# Patient Record
Sex: FEMALE | Race: WHITE | Employment: FULL TIME | URBAN - METROPOLITAN AREA
[De-identification: names, ages, dates, MRNs, and addresses within clinical notes are randomized per-mention and may not be internally consistent; named-entity substitution may affect disease eponyms.]

---

## 2017-09-01 PROBLEM — M54.50 ACUTE BILATERAL LOW BACK PAIN WITHOUT SCIATICA: Status: ACTIVE | Noted: 2017-09-01

## 2018-04-04 PROCEDURE — 88175 CYTOPATH C/V AUTO FLUID REDO: CPT | Performed by: OBSTETRICS & GYNECOLOGY

## 2018-04-04 PROCEDURE — 87491 CHLMYD TRACH DNA AMP PROBE: CPT | Performed by: OBSTETRICS & GYNECOLOGY

## 2018-04-04 PROCEDURE — 36415 COLL VENOUS BLD VENIPUNCTURE: CPT | Performed by: OBSTETRICS & GYNECOLOGY

## 2018-04-04 PROCEDURE — 87591 N.GONORRHOEAE DNA AMP PROB: CPT | Performed by: OBSTETRICS & GYNECOLOGY

## 2018-04-04 PROCEDURE — 86780 TREPONEMA PALLIDUM: CPT | Performed by: OBSTETRICS & GYNECOLOGY

## 2018-04-04 PROCEDURE — 87389 HIV-1 AG W/HIV-1&-2 AB AG IA: CPT | Performed by: OBSTETRICS & GYNECOLOGY

## 2018-05-08 ENCOUNTER — NURSE ONLY (OUTPATIENT)
Dept: FAMILY MEDICINE CLINIC | Facility: CLINIC | Age: 38
End: 2018-05-08

## 2018-05-08 VITALS
SYSTOLIC BLOOD PRESSURE: 120 MMHG | TEMPERATURE: 98 F | BODY MASS INDEX: 39 KG/M2 | WEIGHT: 256 LBS | OXYGEN SATURATION: 98 % | DIASTOLIC BLOOD PRESSURE: 60 MMHG | RESPIRATION RATE: 18 BRPM | HEART RATE: 67 BPM

## 2018-05-08 DIAGNOSIS — J01.40 ACUTE NON-RECURRENT PANSINUSITIS: Primary | ICD-10-CM

## 2018-05-08 DIAGNOSIS — M25.471 SWELLING OF BOTH ANKLES: ICD-10-CM

## 2018-05-08 DIAGNOSIS — M25.472 SWELLING OF BOTH ANKLES: ICD-10-CM

## 2018-05-08 DIAGNOSIS — H69.83 DYSFUNCTION OF BOTH EUSTACHIAN TUBES: ICD-10-CM

## 2018-05-08 PROCEDURE — 99202 OFFICE O/P NEW SF 15 MIN: CPT | Performed by: NURSE PRACTITIONER

## 2018-05-08 RX ORDER — DOXYCYCLINE HYCLATE 100 MG/1
100 CAPSULE ORAL 2 TIMES DAILY
Qty: 14 CAPSULE | Refills: 0 | Status: SHIPPED | OUTPATIENT
Start: 2018-05-08 | End: 2018-05-15

## 2018-05-08 NOTE — PATIENT INSTRUCTIONS
-take daily allergy medication.  claritin or zyrtec are good over the counter options  -stay well hydrated and rest  -gargle with salt water   -ibuprofen or tylenol as needed  -follow up if you develop a fever, worsening in symptoms or no improvement in 3-4 · An expectorant containing guaifenesin may help thin the mucus and promote drainage from the sinuses. · Over-the-counter decongestants may be used unless a similar medicine was prescribed.  Nasal sprays work the fastest. Use one that contains phenylephrin © 1277-6142 [employer] [address]. 140[address] [facility], [facility]. All rights reserved. This information is not intended as a substitute for professional medical care. Always follow your healthcare professional's instructions.

## 2018-05-08 NOTE — PROGRESS NOTES
CHIEF COMPLAINT:   Patient presents with:  Ear Pain: pt c\o of bilateral ear pain, x 6days       HPI:   Algernon Dance is a 40year old female who presents for sinus congestion and ear pain for  6  days. Symptoms have been worsening since onset.  Si Albuterol Sulfate HFA (PROAIR HFA) 108 (90 Base) MCG/ACT Inhalation Aero Soln Inhale 2 puffs into the lungs every 6 (six) hours as needed. Disp: 1 Inhaler Rfl: 0   Rosuvastatin Calcium 20 MG Oral Tab Take 1 tablet (20 mg total) by mouth nightly.  To replace Francesca Stubbs MD;  Location: [place] [address] MANAGEMENT  5/28/2014: Thony Rojas NDL/CATH SPI DX/THER MZW N/A      Comment: Procedure: LUMBAR EPIDURAL;  Surgeon:                Tonia Walker MD;  Location: [facility] 10/30/2014: INJECTION, W/WO CONTRAST, DX/THERAPEUTIC SUBST* N/A      Comment: Procedure: LUMBAR EPIDURAL;  Surgeon:                Marylee Parry, MD;  Location: [address] [place] [address] Drive MANAGEMENT  12/3/2014: INJECTION, W/WO CONTRAST, DX/THERAP Alcohol use: Yes           0.0 oz/week     Comment: social        REVIEW OF SYSTEMS:   GENERAL: feeling tired, no unplanned weight change  SKIN: no rashes or abnormal skin lesions  HEENT: See HPI.     LUNGS: denies shortness of breath or wheezing, See HPI Discussed exam findings and of ear pain and sinus congestion. Will start on antibiotics. Given history of sinus symptoms, advised to start daily allergy medication. Answered patient's questions regarding if ankle swelling was related to illness.  Discussed The sinuses are air-filled spaces within the bones of the face. They connect to the inside of the nose. Sinusitis is an inflammation of the tissue lining the sinus cavity. Sinus inflammation can occur during a cold.  It can also be due to allergies to polle · Do not use nasal rinses or irrigation during an acute sinus infection, unless told to by your health care provider. Rinsing may spread the infection to other sinuses.   · Use acetaminophen or ibuprofen to control pain, unless another pain medicine was pre

## 2018-06-22 PROCEDURE — 83001 ASSAY OF GONADOTROPIN (FSH): CPT | Performed by: INTERNAL MEDICINE

## 2019-01-03 PROBLEM — G44.209 TENSION HEADACHE: Status: ACTIVE | Noted: 2019-01-03

## 2019-01-03 PROBLEM — R05.9 COUGH: Status: ACTIVE | Noted: 2019-01-03

## 2019-01-03 PROBLEM — R09.81 NASAL CONGESTION: Status: ACTIVE | Noted: 2019-01-03

## 2019-01-03 PROBLEM — J01.00 ACUTE NON-RECURRENT MAXILLARY SINUSITIS: Status: ACTIVE | Noted: 2019-01-03

## 2019-01-19 PROBLEM — M54.50 ACUTE BILATERAL LOW BACK PAIN WITHOUT SCIATICA: Status: RESOLVED | Noted: 2017-09-01 | Resolved: 2019-01-19

## 2019-01-19 PROBLEM — R05.9 COUGH: Status: RESOLVED | Noted: 2019-01-03 | Resolved: 2019-01-19

## 2019-01-19 PROBLEM — J01.00 ACUTE NON-RECURRENT MAXILLARY SINUSITIS: Status: RESOLVED | Noted: 2019-01-03 | Resolved: 2019-01-19

## 2019-01-19 PROBLEM — G44.209 TENSION HEADACHE: Status: RESOLVED | Noted: 2019-01-03 | Resolved: 2019-01-19

## 2019-01-19 PROBLEM — R09.81 NASAL CONGESTION: Status: RESOLVED | Noted: 2019-01-03 | Resolved: 2019-01-19

## 2019-04-01 PROBLEM — R73.03 PREDIABETES: Status: ACTIVE | Noted: 2019-04-01

## 2019-04-01 PROBLEM — E78.5 DYSLIPIDEMIA: Status: ACTIVE | Noted: 2019-04-01

## 2019-04-01 PROCEDURE — 36415 COLL VENOUS BLD VENIPUNCTURE: CPT | Performed by: INTERNAL MEDICINE

## 2019-04-01 PROCEDURE — 82671 ASSAY OF ESTROGENS: CPT | Performed by: INTERNAL MEDICINE

## 2019-06-04 PROBLEM — J01.01 ACUTE RECURRENT MAXILLARY SINUSITIS: Status: ACTIVE | Noted: 2019-06-04

## 2019-07-29 PROCEDURE — 87624 HPV HI-RISK TYP POOLED RSLT: CPT | Performed by: OBSTETRICS & GYNECOLOGY

## 2019-07-29 PROCEDURE — 88175 CYTOPATH C/V AUTO FLUID REDO: CPT | Performed by: OBSTETRICS & GYNECOLOGY

## 2019-08-02 PROBLEM — E88.81 METABOLIC SYNDROME: Status: ACTIVE | Noted: 2019-08-02

## 2019-08-02 PROBLEM — E78.1 HYPERTRIGLYCERIDEMIA: Status: ACTIVE | Noted: 2019-08-02

## 2019-08-02 PROBLEM — E88.810 METABOLIC SYNDROME: Status: ACTIVE | Noted: 2019-08-02

## 2019-08-10 PROCEDURE — 83525 ASSAY OF INSULIN: CPT | Performed by: INTERNAL MEDICINE

## 2019-08-10 PROCEDURE — 82397 CHEMILUMINESCENT ASSAY: CPT | Performed by: INTERNAL MEDICINE

## 2019-08-12 PROBLEM — E88.819 INSULIN RESISTANCE: Status: ACTIVE | Noted: 2019-08-12

## 2019-08-12 PROBLEM — E88.81 INSULIN RESISTANCE: Status: ACTIVE | Noted: 2019-08-12

## 2019-08-12 PROCEDURE — 88305 TISSUE EXAM BY PATHOLOGIST: CPT | Performed by: INTERNAL MEDICINE

## 2019-08-30 ENCOUNTER — OFFICE VISIT (OUTPATIENT)
Dept: FAMILY MEDICINE CLINIC | Facility: CLINIC | Age: 39
End: 2019-08-30
Payer: COMMERCIAL

## 2019-08-30 VITALS
OXYGEN SATURATION: 99 % | RESPIRATION RATE: 18 BRPM | BODY MASS INDEX: 37.92 KG/M2 | HEART RATE: 72 BPM | SYSTOLIC BLOOD PRESSURE: 122 MMHG | WEIGHT: 256 LBS | HEIGHT: 69 IN | DIASTOLIC BLOOD PRESSURE: 80 MMHG | TEMPERATURE: 98 F

## 2019-08-30 DIAGNOSIS — J01.00 ACUTE MAXILLARY SINUSITIS, RECURRENCE NOT SPECIFIED: Primary | ICD-10-CM

## 2019-08-30 PROCEDURE — 99213 OFFICE O/P EST LOW 20 MIN: CPT | Performed by: NURSE PRACTITIONER

## 2019-08-30 RX ORDER — FLUTICASONE PROPIONATE 50 MCG
SPRAY, SUSPENSION (ML) NASAL
Qty: 1 BOTTLE | Refills: 0 | Status: SHIPPED | OUTPATIENT
Start: 2019-08-30 | End: 2019-10-04

## 2019-08-30 RX ORDER — DOXYCYCLINE HYCLATE 100 MG/1
100 CAPSULE ORAL 2 TIMES DAILY
Qty: 14 CAPSULE | Refills: 0 | Status: SHIPPED | OUTPATIENT
Start: 2019-08-30 | End: 2019-09-06

## 2019-08-30 NOTE — PROGRESS NOTES
CHIEF COMPLAINT:   Patient presents with:  Rhinorrhea: with facial pain/pressure, slight cough - x6 days      HPI:   Algernon Dance is a 44year old female who presents for sinus congestion for 6 days. Symptoms have suddenly worsened.  Sinus congestio Procedure Laterality Date   • BACK SURGERY  1/8/15    L5-S1 lami disk    •   2010   • COLONOSCOPY, POSSIBLE BIOPSY, POSSIBLE POLYPECTOMY 27787 N/A 2019    Performed by Oscar Medina MD at [address] N/A 12/3/2014 HEAD: atraumatic, normocephalic,  +++ tenderness on palpation of maxillary sinuses  EYES: conjunctiva clear  EARS: TM's clear gray, no bulging, no retraction, no fluid, bony landmarks visualized  NOSE: nostrils patent, green/yellow nasal mucous, nasal muco The sinuses are air-filled spaces within the bones of the face. They connect to the inside of the nose. Sinusitis is an inflammation of the tissue that lines the sinuses. Sinusitis can occur during a cold.  It can also happen due to allergies to pollens and · Do not use nasal rinses or irrigation during an acute sinus infection, unless your healthcare provider tells you to. Rinsing may spread the infection to other areas in your sinuses.   · Use acetaminophen or ibuprofen to control pain, unless another pain m

## 2019-10-31 PROBLEM — J22 LOWER RESPIRATORY INFECTION: Status: ACTIVE | Noted: 2019-10-31

## 2019-10-31 PROBLEM — J01.01 ACUTE RECURRENT MAXILLARY SINUSITIS: Status: RESOLVED | Noted: 2019-06-04 | Resolved: 2019-10-31

## 2019-11-12 PROBLEM — J01.01 ACUTE RECURRENT MAXILLARY SINUSITIS: Status: ACTIVE | Noted: 2019-11-12

## 2019-11-12 PROBLEM — J22 LOWER RESPIRATORY INFECTION: Status: RESOLVED | Noted: 2019-10-31 | Resolved: 2019-11-12

## 2019-12-16 PROBLEM — J02.8 ACUTE PHARYNGITIS DUE TO OTHER SPECIFIED ORGANISMS: Status: ACTIVE | Noted: 2019-12-16

## 2019-12-16 PROBLEM — J03.80 ACUTE TONSILLITIS DUE TO OTHER SPECIFIED ORGANISMS: Status: ACTIVE | Noted: 2019-12-16

## 2019-12-16 PROBLEM — J02.9 SORE THROAT: Status: ACTIVE | Noted: 2019-12-16

## 2019-12-16 PROBLEM — J01.00 ACUTE NON-RECURRENT MAXILLARY SINUSITIS: Status: ACTIVE | Noted: 2019-12-16

## 2020-02-20 ENCOUNTER — OFFICE VISIT (OUTPATIENT)
Dept: FAMILY MEDICINE CLINIC | Facility: CLINIC | Age: 40
End: 2020-02-20
Payer: COMMERCIAL

## 2020-02-20 VITALS
TEMPERATURE: 98 F | RESPIRATION RATE: 20 BRPM | SYSTOLIC BLOOD PRESSURE: 126 MMHG | HEART RATE: 88 BPM | OXYGEN SATURATION: 98 % | BODY MASS INDEX: 36.98 KG/M2 | HEIGHT: 68 IN | WEIGHT: 244 LBS | DIASTOLIC BLOOD PRESSURE: 68 MMHG

## 2020-02-20 DIAGNOSIS — J02.9 SORE THROAT: ICD-10-CM

## 2020-02-20 DIAGNOSIS — J32.9 VIRAL SINUSITIS: Primary | ICD-10-CM

## 2020-02-20 DIAGNOSIS — B97.89 VIRAL SINUSITIS: Primary | ICD-10-CM

## 2020-02-20 LAB
CONTROL LINE PRESENT WITH A CLEAR BACKGROUND (YES/NO): YES YES/NO
KIT LOT #: NORMAL NUMERIC
STREP GRP A CUL-SCR: NEGATIVE

## 2020-02-20 PROCEDURE — 99213 OFFICE O/P EST LOW 20 MIN: CPT | Performed by: NURSE PRACTITIONER

## 2020-02-20 PROCEDURE — 87880 STREP A ASSAY W/OPTIC: CPT | Performed by: NURSE PRACTITIONER

## 2020-02-20 PROCEDURE — 87081 CULTURE SCREEN ONLY: CPT | Performed by: NURSE PRACTITIONER

## 2020-02-20 RX ORDER — MULTIVITAMIN
TABLET ORAL
COMMUNITY
End: 2020-08-27 | Stop reason: ALTCHOICE

## 2020-02-20 NOTE — PROGRESS NOTES
CHIEF COMPLAINT:   Patient presents with:  Nasal Congestion: Sinus pressure, glands feels swollen, no energy, X 5 days      HPI:   Merlynn Apley is a 44year old female who presents for upper respiratory symptoms for  5 days.  Patient reports sore th Rose Marie Oliveira MD at [address] [place] [address] N/A 4/9/2012    Performed by Nils Ferrera MD at [address] [place] [address] N/A 3/21/2012    Performed by Nils Ferrera MD at [address] [place] [address] Time: 02/20/20 12:21 PM   Result Value Ref Range    Strep Grp A Screen Negative Negative    Control Line Present with a clear background (yes/no) Yes Yes/No    Kit Lot # [id number] Numeric    Kit Expiration Date 10/10/21 Date       ASSESSMENT AND PLAN:   Rohini Dickens

## 2020-05-29 PROBLEM — O99.210 OBESITY AFFECTING PREGNANCY, ANTEPARTUM: Status: ACTIVE | Noted: 2020-05-29

## 2020-05-29 PROBLEM — O09.529 AMA (ADVANCED MATERNAL AGE) MULTIGRAVIDA 35+ (HCC): Status: ACTIVE | Noted: 2020-05-29

## 2020-05-29 PROBLEM — O09.529 AMA (ADVANCED MATERNAL AGE) MULTIGRAVIDA 35+: Status: ACTIVE | Noted: 2020-05-29

## 2020-05-29 PROBLEM — O34.219 PREVIOUS CESAREAN DELIVERY AFFECTING PREGNANCY: Status: ACTIVE | Noted: 2020-05-29

## 2020-05-29 PROBLEM — O34.219 PREVIOUS CESAREAN DELIVERY AFFECTING PREGNANCY (HCC): Status: ACTIVE | Noted: 2020-05-29

## 2020-05-29 PROBLEM — O99.210 OBESITY AFFECTING PREGNANCY, ANTEPARTUM (HCC): Status: ACTIVE | Noted: 2020-05-29

## 2020-07-01 PROBLEM — J01.01 ACUTE RECURRENT MAXILLARY SINUSITIS: Status: RESOLVED | Noted: 2019-11-12 | Resolved: 2020-07-01

## 2020-07-01 PROBLEM — R05.9 COUGH: Status: RESOLVED | Noted: 2019-01-03 | Resolved: 2020-07-01

## 2020-07-01 PROBLEM — J03.80 ACUTE TONSILLITIS DUE TO OTHER SPECIFIED ORGANISMS: Status: RESOLVED | Noted: 2019-12-16 | Resolved: 2020-07-01

## 2020-07-01 PROBLEM — J01.00 ACUTE NON-RECURRENT MAXILLARY SINUSITIS: Status: RESOLVED | Noted: 2019-12-16 | Resolved: 2020-07-01

## 2020-07-01 PROBLEM — J02.9 SORE THROAT: Status: RESOLVED | Noted: 2019-12-16 | Resolved: 2020-07-01

## 2020-07-01 PROBLEM — J02.8 ACUTE PHARYNGITIS DUE TO OTHER SPECIFIED ORGANISMS: Status: RESOLVED | Noted: 2019-12-16 | Resolved: 2020-07-01

## 2020-07-24 ENCOUNTER — OFFICE VISIT (OUTPATIENT)
Dept: PERINATAL CARE | Facility: HOSPITAL | Age: 40
End: 2020-07-24
Attending: OBSTETRICS & GYNECOLOGY
Payer: COMMERCIAL

## 2020-07-24 VITALS
HEART RATE: 74 BPM | DIASTOLIC BLOOD PRESSURE: 84 MMHG | BODY MASS INDEX: 37.74 KG/M2 | WEIGHT: 249 LBS | SYSTOLIC BLOOD PRESSURE: 123 MMHG | HEIGHT: 68 IN

## 2020-07-24 DIAGNOSIS — O99.212 OBESITY AFFECTING PREGNANCY IN SECOND TRIMESTER: ICD-10-CM

## 2020-07-24 DIAGNOSIS — Z03.75 SUSPECTED SHORTENING OF CERVIX NOT FOUND: ICD-10-CM

## 2020-07-24 DIAGNOSIS — O09.529 ANTEPARTUM MULTIGRAVIDA OF ADVANCED MATERNAL AGE: Primary | ICD-10-CM

## 2020-07-24 PROCEDURE — 99243 OFF/OP CNSLTJ NEW/EST LOW 30: CPT | Performed by: OBSTETRICS & GYNECOLOGY

## 2020-07-24 PROCEDURE — 76811 OB US DETAILED SNGL FETUS: CPT | Performed by: OBSTETRICS & GYNECOLOGY

## 2020-07-24 PROCEDURE — 76817 TRANSVAGINAL US OBSTETRIC: CPT | Performed by: OBSTETRICS & GYNECOLOGY

## 2020-07-24 NOTE — PROGRESS NOTES
Outpatient Maternal-Fetal Medicine Consultation    Dear Dr. Dinora Charlton    Thank you for requesting ultrasound evaluation and maternal fetal medicine consultation on your patient Giana Yu.   As you are aware she is a 44year old female  with a substance, epidural/subarachnoid; lumbar/sacral (4/9/2012); fluor gid & loclzj ndl/cath spi dx/ther njx (4/9/2012); m-sedaj by  phys perfrmg svc 5+ yr (4/9/2012); injection, w/wo contrast, dx/therapeutic substance, epidural/subarachnoid; lumbar/sacral (N DAILY) Oral Tab, Take by mouth., Disp: , Rfl:   Allergies:   Pcns [Penicillins]      HIVES  Adhesive Tape (Marija*    RASH  Rosin [Pinus Strobu*    RASH    PHYSICAL EXAMINATION:  /84   Pulse 74   Ht 5' 8\" (1.727 m)   Wt 249 lb (112.9 kg)   LMP 03/02/2 ventricles, [place] of [place]casio, choroid plexus, Cisterna Magna, midline falx, cerebellum, cerebellar lobes, posterior fossa, vermis, cavum septi pellucidi.   Spine: appears normal but suboptimal  Heart: Visualized and normal appearance: cardiac location, left ultrasound examination cannot reliably exclude potential genetic abnormalities.  Given that the patient will be 36years old at the time of delivery I reviewed that her risk (at delivery) of having a  with any chromosome abnormality is 1:66 and with obese women, the ovulatory dysfunction is related to polycystic ovary syndrome (PCOS). It is also important to note that even among ovulatory women, increasing obesity is associated with decreasing spontaneous pregnancy rates.   The increased risk of miscar perioperative concerns, including emergency delivery, prolonged incision to delivery interval, blood loss >1000 mL, longer operative times, wound infection, thromboembolism, and endometritis.             Maternal obesity appears to be associated with a smal

## 2020-07-31 PROBLEM — O24.419 GESTATIONAL DIABETES: Status: ACTIVE | Noted: 2020-07-31

## 2020-07-31 PROBLEM — O24.419 GESTATIONAL DIABETES (HCC): Status: ACTIVE | Noted: 2020-07-31

## 2020-08-20 NOTE — PROGRESS NOTES
Outpatient Maternal-Fetal Medicine Consultation    Dear Dr. John Caruso    Thank you for requesting ultrasound evaluation and maternal fetal medicine consultation on your patient Chris Hoover.   As you are aware she is a 36year old female  with a adverse obstetric outcomes. We also reviewed her  increased risk of having diabetes later in life. The importance of good glycemic control and avoidance of prolonged hypoglycemia and hyperglycemia was addressed.     She was instructed on the diabetic diet; your patient. Please do not hesitate to contact me if additional questions or concerns arise. Edison Fuller. William Odell M.D. The majority of the time (>50%) was spent in review of records, consultation and coordination of care.   Our discussion is summarize

## 2020-08-21 ENCOUNTER — OFFICE VISIT (OUTPATIENT)
Dept: PERINATAL CARE | Facility: HOSPITAL | Age: 40
End: 2020-08-21
Attending: OBSTETRICS & GYNECOLOGY
Payer: COMMERCIAL

## 2020-08-21 VITALS
BODY MASS INDEX: 38.19 KG/M2 | HEIGHT: 68 IN | DIASTOLIC BLOOD PRESSURE: 74 MMHG | SYSTOLIC BLOOD PRESSURE: 110 MMHG | HEART RATE: 94 BPM | WEIGHT: 252 LBS

## 2020-08-21 DIAGNOSIS — E66.01 MORBID OBESITY (HCC): ICD-10-CM

## 2020-08-21 DIAGNOSIS — O24.414 INSULIN CONTROLLED GESTATIONAL DIABETES MELLITUS (GDM) IN SECOND TRIMESTER: Primary | ICD-10-CM

## 2020-08-21 DIAGNOSIS — O09.529 ANTEPARTUM MULTIGRAVIDA OF ADVANCED MATERNAL AGE: ICD-10-CM

## 2020-08-21 DIAGNOSIS — O99.212 OBESITY AFFECTING PREGNANCY IN SECOND TRIMESTER: ICD-10-CM

## 2020-08-21 DIAGNOSIS — O24.419 GDM, CLASS A2: ICD-10-CM

## 2020-08-21 DIAGNOSIS — O09.522 MULTIGRAVIDA OF ADVANCED MATERNAL AGE IN SECOND TRIMESTER: ICD-10-CM

## 2020-08-21 DIAGNOSIS — O09.529 ANTEPARTUM MULTIGRAVIDA OF ADVANCED MATERNAL AGE: Primary | ICD-10-CM

## 2020-08-21 PROCEDURE — 76825 ECHO EXAM OF FETAL HEART: CPT | Performed by: OBSTETRICS & GYNECOLOGY

## 2020-08-21 PROCEDURE — 93325 DOPPLER ECHO COLOR FLOW MAPG: CPT | Performed by: OBSTETRICS & GYNECOLOGY

## 2020-08-21 PROCEDURE — 76827 ECHO EXAM OF FETAL HEART: CPT | Performed by: OBSTETRICS & GYNECOLOGY

## 2020-08-21 PROCEDURE — 93325 DOPPLER ECHO COLOR FLOW MAPG: CPT

## 2020-08-21 PROCEDURE — 76827 ECHO EXAM OF FETAL HEART: CPT

## 2020-08-21 PROCEDURE — 99243 OFF/OP CNSLTJ NEW/EST LOW 30: CPT | Performed by: OBSTETRICS & GYNECOLOGY

## 2020-08-24 DIAGNOSIS — O24.419 GDM, CLASS A2: ICD-10-CM

## 2020-08-27 DIAGNOSIS — O24.419 GDM, CLASS A2: ICD-10-CM

## 2020-09-01 NOTE — PROGRESS NOTES
Outpatient Maternal-Fetal Medicine Consultation     Dear Dr. Regla Briones     Thank you for requesting ultrasound evaluation and maternal fetal medicine consultation on your patient Dat Rincon.   As you are aware she is a 36year old female  with anterior. Cardiac activity is present at 129 bpm  EFW 1129 g ( 2 lb 8 oz); 61%. LAUREN is  17.6 cm.   MVP is 6.8 cm    See Imaging Tab For Complete Ultrasound Report       DISCUSSION  During her visit we discussed and reviewed the following issues:  LURDES weight gain was reviewed.     IMPRESSION:  · IUP at 27w1d  · AMA: low-risk cell free fetal DNA, declined invasive testing  · Obesity  · GDM A2 - probable pregestational; poorly controlled  · Suboptimal fetal anatomy views on repeated attempts     RECOMMEND

## 2020-09-08 ENCOUNTER — ULTRASOUND ENCOUNTER (OUTPATIENT)
Dept: PERINATAL CARE | Facility: HOSPITAL | Age: 40
End: 2020-09-08
Attending: OBSTETRICS & GYNECOLOGY
Payer: COMMERCIAL

## 2020-09-08 VITALS
WEIGHT: 254 LBS | RESPIRATION RATE: 20 BRPM | SYSTOLIC BLOOD PRESSURE: 123 MMHG | DIASTOLIC BLOOD PRESSURE: 75 MMHG | HEART RATE: 74 BPM | BODY MASS INDEX: 38.49 KG/M2 | HEIGHT: 68 IN

## 2020-09-08 DIAGNOSIS — O24.419 GDM, CLASS A2: ICD-10-CM

## 2020-09-08 DIAGNOSIS — O99.212 OBESITY AFFECTING PREGNANCY IN SECOND TRIMESTER: ICD-10-CM

## 2020-09-08 DIAGNOSIS — O09.529 ANTEPARTUM MULTIGRAVIDA OF ADVANCED MATERNAL AGE: ICD-10-CM

## 2020-09-08 DIAGNOSIS — O09.529 ANTEPARTUM MULTIGRAVIDA OF ADVANCED MATERNAL AGE: Primary | ICD-10-CM

## 2020-09-08 DIAGNOSIS — O09.523 MULTIGRAVIDA OF ADVANCED MATERNAL AGE IN THIRD TRIMESTER: ICD-10-CM

## 2020-09-08 DIAGNOSIS — E11.9 TYPE 2 DIABETES MELLITUS WITHOUT COMPLICATION, WITHOUT LONG-TERM CURRENT USE OF INSULIN (HCC): ICD-10-CM

## 2020-09-08 PROCEDURE — 99215 OFFICE O/P EST HI 40 MIN: CPT | Performed by: OBSTETRICS & GYNECOLOGY

## 2020-09-08 PROCEDURE — 76816 OB US FOLLOW-UP PER FETUS: CPT | Performed by: OBSTETRICS & GYNECOLOGY

## 2020-09-08 RX ORDER — INSULIN LISPRO 100 [IU]/ML
INJECTION, SOLUTION INTRAVENOUS; SUBCUTANEOUS
Qty: 1 DEVICE | Refills: 3 | Status: SHIPPED | OUTPATIENT
Start: 2020-09-08 | End: 2020-11-09

## 2020-09-08 NOTE — PROGRESS NOTES
Pt seen in M at 27 1/7 weeks gestation, ambulatory and without concerns. + FM. Blood sugars available for review.

## 2020-09-14 DIAGNOSIS — O24.419 GDM, CLASS A2: ICD-10-CM

## 2020-09-21 DIAGNOSIS — O24.419 GDM, CLASS A2: ICD-10-CM

## 2020-09-22 ENCOUNTER — HOSPITAL ENCOUNTER (OUTPATIENT)
Dept: PERINATAL CARE | Facility: HOSPITAL | Age: 40
Discharge: HOME | End: 2020-09-22
Attending: PEDIATRICS
Payer: COMMERCIAL

## 2020-09-22 DIAGNOSIS — Z36.83 ENCOUNTER FOR FETAL SCREENING FOR CONGENITAL CARDIAC ABNORMALITIES: ICD-10-CM

## 2020-09-22 DIAGNOSIS — O09.523 MULTIGRAVIDA OF ADVANCED MATERNAL AGE IN THIRD TRIMESTER: ICD-10-CM

## 2020-09-22 DIAGNOSIS — E66.01 MORBID OBESITY (HCC): ICD-10-CM

## 2020-09-22 DIAGNOSIS — E88.81 INSULIN RESISTANCE: ICD-10-CM

## 2020-09-22 DIAGNOSIS — E88.81 INSULIN RESISTANCE: Primary | ICD-10-CM

## 2020-09-22 DIAGNOSIS — O99.212 OBESITY AFFECTING PREGNANCY IN SECOND TRIMESTER: ICD-10-CM

## 2020-09-22 PROCEDURE — 76827 ECHO EXAM OF FETAL HEART: CPT

## 2020-09-22 PROCEDURE — 76825 ECHO EXAM OF FETAL HEART: CPT | Performed by: PEDIATRICS

## 2020-09-22 PROCEDURE — 93325 DOPPLER ECHO COLOR FLOW MAPG: CPT

## 2020-09-22 NOTE — PROGRESS NOTES
CARDIOLOGY CONSULTATION :    Dear Dr. Dinora Springer     Thank you for requesting  fetal echocardiogram and  cardiology consultation on your patient Yunior Mtz.   As you are aware she is a 36year old female  with a solis preg the pulmonary artery by its branching) which crosses the course of the proximal aorta, indicative of normal relationship of the great arteries.  Main PA: the main pulmonary artery can be seen bifurcating into the ductus arteriosus and the right pulmonary ar testing  Obesity  GDM A2 - probable pregestational; previously poorly controlled, currently well controlled per patient  Suboptimal fetal anatomy views on repeated attempts  Limited unremarkable fetal echocardiogram    RECOMMENDATIONS :  Diabetes control

## 2020-09-28 ENCOUNTER — PATIENT MESSAGE (OUTPATIENT)
Dept: PERINATAL CARE | Facility: HOSPITAL | Age: 40
End: 2020-09-28

## 2020-09-28 DIAGNOSIS — O24.419 GDM, CLASS A2: ICD-10-CM

## 2020-09-28 RX ORDER — LANCETS 33 GAUGE
1 EACH MISCELLANEOUS 4 TIMES DAILY
Qty: 1 BOX | Refills: 2 | Status: SHIPPED | OUTPATIENT
Start: 2020-09-28 | End: 2020-11-30 | Stop reason: CLARIF

## 2020-09-28 NOTE — TELEPHONE ENCOUNTER
30 0/7 levemir 8 am., 22 HS    Fasting 5/5 106--123  Breakfast 1/2  100-124  Lunch 3/5        110-137  Dinner 1/3       099-040

## 2020-09-28 NOTE — TELEPHONE ENCOUNTER
Regarding: RE: Blood sugar review  Contact: [phone]  I do need lancets for my new machine. Can you please send a prescription for lancets for the OneTouch Verio machine? Please call into:   [facility] [address] [place] [address] [phone] -----       From:Dorys CORNEJO       Sent:9/28/2020  7:51 AM CDT         To:Bessie Correa    Subject:Blood sugar review    Good morning! Could you please update your blood sugars for Dr. Tammy Grimm to review this morning?   Thank you  Yonatan Razo RN MFM

## 2020-09-29 ENCOUNTER — TELEPHONE (OUTPATIENT)
Dept: PERINATAL CARE | Facility: HOSPITAL | Age: 40
End: 2020-09-29

## 2020-10-05 ENCOUNTER — PATIENT MESSAGE (OUTPATIENT)
Dept: PERINATAL CARE | Facility: HOSPITAL | Age: 40
End: 2020-10-05

## 2020-10-05 ENCOUNTER — TELEPHONE (OUTPATIENT)
Dept: PERINATAL CARE | Facility: HOSPITAL | Age: 40
End: 2020-10-05

## 2020-10-05 DIAGNOSIS — O24.419 GDM, CLASS A2: ICD-10-CM

## 2020-10-05 NOTE — TELEPHONE ENCOUNTER
Crystal Mas! The wording is tricky on this message. .. this is for your upcoming appointment, not for a test scheduled today. Hope this clarifies.     Yonatan Razo RN MFM

## 2020-10-05 NOTE — TELEPHONE ENCOUNTER
GA  31 weeks    Levemir AM 10 units  HS 24 units  Lispro   AM 4 units  Dinner 4 units    4 out of 4   Range 103 to 122  Breakfast 0 out of 3  Range  96 to 105  Lunch 1 out of 4   Range 103 to 126   Dinner 1 out of 4  Range 103 to 124

## 2020-10-05 NOTE — TELEPHONE ENCOUNTER
GA  31 weeks     Levemir AM 10 units  HS 28 units  Lispro   AM 4 units  Dinner 4 units     4 out of 4   Range 103 to 122  Breakfast 0 out of 3  Range  96 to 105  Lunch 1 out of 4   Range 103 to 126   Dinner 1 out of 4  Range 103 to 124

## 2020-10-05 NOTE — TELEPHONE ENCOUNTER
From: Maritza Beltrán  To: Yunior Mtz  Sent: 10/5/2020 7:14 AM CDT  Subject: E CHECK IN VIA MY CHART    Thank you for scheduling your test with Afshan Yin.    Please complete your Echeck-In prior to your upcoming appointment by clicking epic

## 2020-10-06 DIAGNOSIS — Z34.90 PREGNANCY: Primary | ICD-10-CM

## 2020-10-07 NOTE — PROGRESS NOTES
Kenny Wayne    Dear Dr. Brenda Dodge    Thank you for requesting ultrasound evaluation and maternal fetal medicine consultation on your patient Andre Hennessy.   As you are aware she is a 36year old female  with a sin Judgment: Judgment normal.   Vitals signs and nursing note reviewed. OBSTETRIC ULTRASOUND  Single IUP in cephalic presentation. Placenta is anterior. Cardiac activity is present at 141 bpm  EFW 2194 g ( 4 lb 13 oz); 64%.     LAUREN is  1 on repeated attempts s/p fetal echo with Dr. Tc Crump     RECOMMENDATIONS:  · Continue care with Dr. Morales   ·  insulin : See above  · Continue four times daily capillary blood glucose assessments (fasting and 2 hour postprandial)  · Weekly Maternal-Fetal

## 2020-10-12 ENCOUNTER — TELEPHONE (OUTPATIENT)
Dept: PERINATAL CARE | Facility: HOSPITAL | Age: 40
End: 2020-10-12

## 2020-10-12 DIAGNOSIS — O24.419 GDM, CLASS A2: ICD-10-CM

## 2020-10-12 NOTE — TELEPHONE ENCOUNTER
GA 32    Levemir AM 10 units   HS 28 units  Novolog   AM 4 units   PM 4 units      Fasting 4 out of 5         Range 91 to 101  Breakfast  0 out 5         Range 90 to 120  Lunch   0 out of 5         Range  108 to 120  Dinner 1 out of 5           Range 99

## 2020-10-12 NOTE — TELEPHONE ENCOUNTER
Levemir AM 10 units   HS 30 units  Novolog   AM 4 units   PM 4 units        Fasting 4 out of 5         Range 91 to 101  Breakfast  0 out 5         Range 90 to 120  Lunch   0 out of 5         Range  108 to 120  Dinner 1 out of 5           Range 99 to 122

## 2020-10-14 ENCOUNTER — OFFICE VISIT (OUTPATIENT)
Dept: PERINATAL CARE | Facility: HOSPITAL | Age: 40
End: 2020-10-14
Attending: OBSTETRICS & GYNECOLOGY
Payer: COMMERCIAL

## 2020-10-14 VITALS
WEIGHT: 258 LBS | BODY MASS INDEX: 39 KG/M2 | SYSTOLIC BLOOD PRESSURE: 119 MMHG | DIASTOLIC BLOOD PRESSURE: 84 MMHG | HEART RATE: 112 BPM

## 2020-10-14 DIAGNOSIS — E66.01 MORBID OBESITY (HCC): ICD-10-CM

## 2020-10-14 DIAGNOSIS — O24.419 GDM, CLASS A2: ICD-10-CM

## 2020-10-14 DIAGNOSIS — E11.9 TYPE 2 DIABETES MELLITUS WITHOUT COMPLICATION, WITHOUT LONG-TERM CURRENT USE OF INSULIN (HCC): ICD-10-CM

## 2020-10-14 DIAGNOSIS — O09.523 MULTIGRAVIDA OF ADVANCED MATERNAL AGE IN THIRD TRIMESTER: ICD-10-CM

## 2020-10-14 DIAGNOSIS — E88.81 INSULIN RESISTANCE: ICD-10-CM

## 2020-10-14 DIAGNOSIS — O09.522 MULTIGRAVIDA OF ADVANCED MATERNAL AGE IN SECOND TRIMESTER: Primary | ICD-10-CM

## 2020-10-14 DIAGNOSIS — O09.522 MULTIGRAVIDA OF ADVANCED MATERNAL AGE IN SECOND TRIMESTER: ICD-10-CM

## 2020-10-14 PROCEDURE — 99213 OFFICE O/P EST LOW 20 MIN: CPT | Performed by: OBSTETRICS & GYNECOLOGY

## 2020-10-14 PROCEDURE — 76816 OB US FOLLOW-UP PER FETUS: CPT | Performed by: OBSTETRICS & GYNECOLOGY

## 2020-10-14 PROCEDURE — 76819 FETAL BIOPHYS PROFIL W/O NST: CPT

## 2020-10-14 PROCEDURE — 76819 FETAL BIOPHYS PROFIL W/O NST: CPT | Performed by: OBSTETRICS & GYNECOLOGY

## 2020-10-19 ENCOUNTER — TELEPHONE (OUTPATIENT)
Dept: PERINATAL CARE | Facility: HOSPITAL | Age: 40
End: 2020-10-19

## 2020-10-19 RX ORDER — INSULIN LISPRO 100 [IU]/ML
INJECTION, SOLUTION INTRAVENOUS; SUBCUTANEOUS
Qty: 1 DEVICE | Refills: 3 | Status: CANCELLED | OUTPATIENT
Start: 2020-10-19

## 2020-10-19 NOTE — TELEPHONE ENCOUNTER
GA 33    Levemir  AM 10 units  HS 30 units  Novolog  AM 4 units  Dinner 4 units    Fasting  1 out of 4    Range 85 to 100  Breakfast 1 out of 4  Range 112 to 190  Lunch 1 out of 4       Range 110 to 180  Dinner 0 out of 4      Range 110 to 120

## 2020-10-20 ENCOUNTER — OFFICE VISIT (OUTPATIENT)
Dept: PERINATAL CARE | Facility: HOSPITAL | Age: 40
End: 2020-10-20
Attending: OBSTETRICS & GYNECOLOGY
Payer: COMMERCIAL

## 2020-10-20 VITALS
DIASTOLIC BLOOD PRESSURE: 79 MMHG | BODY MASS INDEX: 40 KG/M2 | SYSTOLIC BLOOD PRESSURE: 125 MMHG | WEIGHT: 264 LBS | HEART RATE: 88 BPM

## 2020-10-20 DIAGNOSIS — O24.414 INSULIN CONTROLLED GESTATIONAL DIABETES MELLITUS (GDM) IN THIRD TRIMESTER: Primary | ICD-10-CM

## 2020-10-20 DIAGNOSIS — O09.523 MULTIGRAVIDA OF ADVANCED MATERNAL AGE IN THIRD TRIMESTER: ICD-10-CM

## 2020-10-20 DIAGNOSIS — E66.01 MORBID OBESITY (HCC): ICD-10-CM

## 2020-10-20 PROCEDURE — 59025 FETAL NON-STRESS TEST: CPT | Performed by: OBSTETRICS & GYNECOLOGY

## 2020-10-20 NOTE — PROGRESS NOTES
Encounter for NST due to advanced maternal age, class III obesity gestational diabetes. Nursing note appreciated.   /79   Pulse 88   Wt 264 lb (119.7 kg)   LMP 03/02/2020 (Approximate)   BMI 40.14 kg/m²       The fetal heart rate baseline is 130 bp

## 2020-10-22 NOTE — PROGRESS NOTES
NON-STRESS TEST REPORT      /79   Pulse 85   LMP 03/02/2020 (Approximate)     FHRT:  135 baseline, moderate variability, decelerations: none    Uterine Contractions: none    IMPRESSION:  · IUP at 34w1dd: Reactive   · AMA: low-risk cell free fetal DNA

## 2020-10-26 ENCOUNTER — TELEPHONE (OUTPATIENT)
Dept: PERINATAL CARE | Facility: HOSPITAL | Age: 40
End: 2020-10-26

## 2020-10-26 PROBLEM — O24.419 GDM, CLASS A2: Status: ACTIVE | Noted: 2020-10-26

## 2020-10-26 PROBLEM — O24.419 GESTATIONAL DIABETES (HCC): Status: RESOLVED | Noted: 2020-07-31 | Resolved: 2020-10-26

## 2020-10-26 PROBLEM — O24.419 GDM, CLASS A2 (HCC): Status: ACTIVE | Noted: 2020-10-26

## 2020-10-26 PROBLEM — O24.419 GESTATIONAL DIABETES: Status: RESOLVED | Noted: 2020-07-31 | Resolved: 2020-10-26

## 2020-10-26 NOTE — TELEPHONE ENCOUNTER
Levemir 12/32  Novolog 6am, 6 dinner     Fasting 3/4     Breakfast 4/4  120-128  Lunch 3/4      117-134  Dinner 2/4      101-126

## 2020-10-26 NOTE — TELEPHONE ENCOUNTER
34 0/7  Levemir 10/30  Novolog 4am, 4 dinner    Fasting 3/4     Breakfast 4/4  120-128  Lunch 3/4      117-134  Dinner 2/4      101-126    No sugars for 10/22,10/23

## 2020-10-27 ENCOUNTER — OFFICE VISIT (OUTPATIENT)
Dept: PERINATAL CARE | Facility: HOSPITAL | Age: 40
End: 2020-10-27
Attending: OBSTETRICS & GYNECOLOGY
Payer: COMMERCIAL

## 2020-10-27 VITALS — DIASTOLIC BLOOD PRESSURE: 79 MMHG | SYSTOLIC BLOOD PRESSURE: 137 MMHG | HEART RATE: 85 BPM

## 2020-10-27 DIAGNOSIS — O24.414 INSULIN CONTROLLED GESTATIONAL DIABETES MELLITUS (GDM) IN THIRD TRIMESTER: Primary | ICD-10-CM

## 2020-10-27 PROCEDURE — 59025 FETAL NON-STRESS TEST: CPT | Performed by: OBSTETRICS & GYNECOLOGY

## 2020-10-30 ENCOUNTER — OFFICE VISIT (OUTPATIENT)
Dept: PERINATAL CARE | Facility: HOSPITAL | Age: 40
End: 2020-10-30
Attending: OBSTETRICS & GYNECOLOGY
Payer: COMMERCIAL

## 2020-10-30 VITALS — DIASTOLIC BLOOD PRESSURE: 77 MMHG | HEART RATE: 86 BPM | SYSTOLIC BLOOD PRESSURE: 122 MMHG

## 2020-10-30 DIAGNOSIS — O24.414 INSULIN CONTROLLED GESTATIONAL DIABETES MELLITUS (GDM) IN THIRD TRIMESTER: Primary | ICD-10-CM

## 2020-10-30 DIAGNOSIS — O09.523 MULTIGRAVIDA OF ADVANCED MATERNAL AGE IN THIRD TRIMESTER: ICD-10-CM

## 2020-10-30 PROCEDURE — 59025 FETAL NON-STRESS TEST: CPT | Performed by: OBSTETRICS & GYNECOLOGY

## 2020-10-30 NOTE — PROGRESS NOTES
Encounter for a nonstress test.  /77   Pulse 86   LMP 03/02/2020 (Approximate)     Fetal heart rate baseline was 145 bpm.  Moderate variability is noted. Accelerations are present. No decelerations.   Reactive NST  Zion-no uterine contractions were

## 2020-11-02 ENCOUNTER — PATIENT MESSAGE (OUTPATIENT)
Dept: PERINATAL CARE | Facility: HOSPITAL | Age: 40
End: 2020-11-02

## 2020-11-02 DIAGNOSIS — O24.419 GDM, CLASS A2: ICD-10-CM

## 2020-11-02 NOTE — TELEPHONE ENCOUNTER
GA  35    Levemir AM 12 units  HS 32 units  Novolog  AM 6 unit PM 6 units    Fasting 3 out of  7      Range 90 to 115  Breakfast 2 out of 7    Range 115 to 125  Lunch 2 out of 7         Range 118 to 130  Dinner 3 out of 7        Range 110 to 135

## 2020-11-06 PROBLEM — E88.819 INSULIN RESISTANCE: Status: RESOLVED | Noted: 2019-08-12 | Resolved: 2020-11-06

## 2020-11-06 PROBLEM — E88.810 METABOLIC SYNDROME: Status: RESOLVED | Noted: 2019-08-02 | Resolved: 2020-11-06

## 2020-11-06 PROBLEM — E88.81 INSULIN RESISTANCE: Status: RESOLVED | Noted: 2019-08-12 | Resolved: 2020-11-06

## 2020-11-06 PROBLEM — E78.5 DYSLIPIDEMIA: Status: RESOLVED | Noted: 2019-04-01 | Resolved: 2020-11-06

## 2020-11-06 PROBLEM — E88.81 METABOLIC SYNDROME: Status: RESOLVED | Noted: 2019-08-02 | Resolved: 2020-11-06

## 2020-11-06 PROBLEM — R73.03 PREDIABETES: Status: RESOLVED | Noted: 2019-04-01 | Resolved: 2020-11-06

## 2020-11-09 ENCOUNTER — OFFICE VISIT (OUTPATIENT)
Dept: PERINATAL CARE | Facility: HOSPITAL | Age: 40
End: 2020-11-09
Attending: OBSTETRICS & GYNECOLOGY
Payer: COMMERCIAL

## 2020-11-09 DIAGNOSIS — O24.419 GDM, CLASS A2: ICD-10-CM

## 2020-11-09 DIAGNOSIS — O09.529 AMA (ADVANCED MATERNAL AGE) MULTIGRAVIDA 35+: Primary | ICD-10-CM

## 2020-11-09 DIAGNOSIS — O09.523 MULTIGRAVIDA OF ADVANCED MATERNAL AGE IN THIRD TRIMESTER: ICD-10-CM

## 2020-11-09 DIAGNOSIS — O24.414 INSULIN CONTROLLED GESTATIONAL DIABETES MELLITUS (GDM) IN THIRD TRIMESTER: ICD-10-CM

## 2020-11-09 DIAGNOSIS — E66.01 MORBID OBESITY (HCC): ICD-10-CM

## 2020-11-09 PROCEDURE — 99212 OFFICE O/P EST SF 10 MIN: CPT | Performed by: OBSTETRICS & GYNECOLOGY

## 2020-11-09 PROCEDURE — 59025 FETAL NON-STRESS TEST: CPT | Performed by: OBSTETRICS & GYNECOLOGY

## 2020-11-09 RX ORDER — INSULIN LISPRO 100 [IU]/ML
INJECTION, SOLUTION INTRAVENOUS; SUBCUTANEOUS
Qty: 1 DEVICE | Refills: 3 | Status: SHIPPED | OUTPATIENT
Start: 2020-11-09 | End: 2020-11-23

## 2020-11-09 RX ORDER — BLOOD SUGAR DIAGNOSTIC
STRIP MISCELLANEOUS
Qty: 112 STRIP | Refills: 4 | Status: SHIPPED | OUTPATIENT
Start: 2020-11-09 | End: 2020-11-30 | Stop reason: CLARIF

## 2020-11-09 RX ORDER — INSULIN LISPRO 100 [IU]/ML
INJECTION, SOLUTION INTRAVENOUS; SUBCUTANEOUS
Refills: 0 | Status: ON HOLD | COMMUNITY
Start: 2020-11-09 | End: 2020-11-26

## 2020-11-09 NOTE — PROGRESS NOTES
LMP 03/02/2020 (Approximate)     I reviewed her glucose logs. Fastings are increasing. Insulin adjustment made and given to the patient.     NST reactive  Baseline 130

## 2020-11-09 NOTE — PROGRESS NOTES
Dominick Ng    Dear Dr. John Caruso    Thank you for requesting ultrasound evaluation and maternal fetal medicine consultation on your patient Clara Diamond.   As you are aware she is a 36year old female  with a sin current insulin regimen is:  Levemir 14 units qAM, 36 units qHS  Humalog 6 units with breakfast, 0 units with lunch, 8 units with dinner.     Continued medication use and capillary blood glucose assessments were reviewed as well as recommendations for seria

## 2020-11-12 ENCOUNTER — ULTRASOUND ENCOUNTER (OUTPATIENT)
Dept: PERINATAL CARE | Facility: HOSPITAL | Age: 40
End: 2020-11-12
Attending: OBSTETRICS & GYNECOLOGY
Payer: COMMERCIAL

## 2020-11-12 VITALS
DIASTOLIC BLOOD PRESSURE: 88 MMHG | WEIGHT: 284 LBS | HEART RATE: 83 BPM | BODY MASS INDEX: 42 KG/M2 | SYSTOLIC BLOOD PRESSURE: 145 MMHG

## 2020-11-12 DIAGNOSIS — O09.523 MULTIGRAVIDA OF ADVANCED MATERNAL AGE IN THIRD TRIMESTER: ICD-10-CM

## 2020-11-12 DIAGNOSIS — O24.414 INSULIN CONTROLLED GESTATIONAL DIABETES MELLITUS (GDM) IN THIRD TRIMESTER: ICD-10-CM

## 2020-11-12 DIAGNOSIS — O24.419 GDM, CLASS A2: ICD-10-CM

## 2020-11-12 DIAGNOSIS — O09.523 MULTIGRAVIDA OF ADVANCED MATERNAL AGE IN THIRD TRIMESTER: Primary | ICD-10-CM

## 2020-11-12 DIAGNOSIS — E66.01 MORBID OBESITY (HCC): ICD-10-CM

## 2020-11-12 PROCEDURE — 99214 OFFICE O/P EST MOD 30 MIN: CPT | Performed by: OBSTETRICS & GYNECOLOGY

## 2020-11-12 PROCEDURE — 76816 OB US FOLLOW-UP PER FETUS: CPT | Performed by: OBSTETRICS & GYNECOLOGY

## 2020-11-12 PROCEDURE — 76819 FETAL BIOPHYS PROFIL W/O NST: CPT | Performed by: OBSTETRICS & GYNECOLOGY

## 2020-11-12 PROCEDURE — 76819 FETAL BIOPHYS PROFIL W/O NST: CPT

## 2020-11-17 ENCOUNTER — ULTRASOUND ENCOUNTER (OUTPATIENT)
Dept: PERINATAL CARE | Facility: HOSPITAL | Age: 40
End: 2020-11-17
Attending: OBSTETRICS & GYNECOLOGY
Payer: COMMERCIAL

## 2020-11-17 VITALS
DIASTOLIC BLOOD PRESSURE: 85 MMHG | SYSTOLIC BLOOD PRESSURE: 140 MMHG | HEART RATE: 77 BPM | WEIGHT: 284 LBS | BODY MASS INDEX: 42 KG/M2

## 2020-11-17 DIAGNOSIS — O24.414 INSULIN CONTROLLED GESTATIONAL DIABETES MELLITUS (GDM) IN THIRD TRIMESTER: Primary | ICD-10-CM

## 2020-11-17 DIAGNOSIS — O99.210 OBESITY AFFECTING PREGNANCY: ICD-10-CM

## 2020-11-17 DIAGNOSIS — O24.414 INSULIN CONTROLLED GESTATIONAL DIABETES MELLITUS (GDM) IN THIRD TRIMESTER: ICD-10-CM

## 2020-11-17 DIAGNOSIS — O99.213 OBESITY AFFECTING PREGNANCY IN THIRD TRIMESTER: ICD-10-CM

## 2020-11-17 PROCEDURE — 76819 FETAL BIOPHYS PROFIL W/O NST: CPT | Performed by: OBSTETRICS & GYNECOLOGY

## 2020-11-17 NOTE — PROGRESS NOTES
Encounter for  testing, no physician visit. The nurse attempted to obtain a nonstress test but based on maternal habitus she was unable to get an adequate tracing of the fetus. Single IUP in cephalic presentation. Placenta is anterior.    Ca

## 2020-11-20 ENCOUNTER — ULTRASOUND ENCOUNTER (OUTPATIENT)
Dept: PERINATAL CARE | Facility: HOSPITAL | Age: 40
End: 2020-11-20
Attending: OBSTETRICS & GYNECOLOGY
Payer: COMMERCIAL

## 2020-11-20 VITALS — DIASTOLIC BLOOD PRESSURE: 85 MMHG | SYSTOLIC BLOOD PRESSURE: 143 MMHG | HEART RATE: 85 BPM

## 2020-11-20 DIAGNOSIS — O09.523 MULTIGRAVIDA OF ADVANCED MATERNAL AGE IN THIRD TRIMESTER: Primary | ICD-10-CM

## 2020-11-20 DIAGNOSIS — O34.219 PREVIOUS CESAREAN DELIVERY AFFECTING PREGNANCY: ICD-10-CM

## 2020-11-20 DIAGNOSIS — O24.419 GDM, CLASS A2: ICD-10-CM

## 2020-11-20 DIAGNOSIS — O09.523 MULTIGRAVIDA OF ADVANCED MATERNAL AGE IN THIRD TRIMESTER: ICD-10-CM

## 2020-11-20 DIAGNOSIS — O99.212 OBESITY AFFECTING PREGNANCY IN SECOND TRIMESTER: ICD-10-CM

## 2020-11-20 PROCEDURE — 76819 FETAL BIOPHYS PROFIL W/O NST: CPT | Performed by: OBSTETRICS & GYNECOLOGY

## 2020-11-20 PROCEDURE — 99213 OFFICE O/P EST LOW 20 MIN: CPT | Performed by: OBSTETRICS & GYNECOLOGY

## 2020-11-20 NOTE — PROGRESS NOTES
Pt here for BPP due to unable to trace on NST  States + Fm but slowed over last few day  No complaints

## 2020-11-22 ENCOUNTER — APPOINTMENT (OUTPATIENT)
Dept: LAB | Facility: HOSPITAL | Age: 40
End: 2020-11-22
Attending: OBSTETRICS & GYNECOLOGY
Payer: COMMERCIAL

## 2020-11-22 DIAGNOSIS — Z34.90 PREGNANCY: ICD-10-CM

## 2020-11-23 ENCOUNTER — TELEPHONE (OUTPATIENT)
Dept: PERINATAL CARE | Facility: HOSPITAL | Age: 40
End: 2020-11-23

## 2020-11-23 DIAGNOSIS — O24.419 GDM, CLASS A2: ICD-10-CM

## 2020-11-23 RX ORDER — INSULIN LISPRO 100 [IU]/ML
INJECTION, SOLUTION INTRAVENOUS; SUBCUTANEOUS
Qty: 1 DEVICE | Refills: 3 | Status: ON HOLD | COMMUNITY
Start: 2020-11-23 | End: 2020-11-26

## 2020-11-23 NOTE — TELEPHONE ENCOUNTER
Levemir AM  16 units   HS  38 units  Novolog AM 8 units PM 10 units        Fasting 7 out of 10       Range 90 to 110  Breakfast 7 out of 10    Range 119 to 140  Lunch 6 out of 10         Range 110 to 139  Dinner 7 out of 10         Range 116 to 135

## 2020-11-23 NOTE — TELEPHONE ENCOUNTER
GA 38      C/S 11/25    Levemir AM  14 units   HS  36 units  Novolog AM 6 units PM 8 units      Fasting 7 out of 10       Range 90 to 110  Breakfast 7 out of 10    Range 119 to 140  Lunch 6 out of 10         Range 110 to 139  Dinner 7 out of 10         Ran

## 2020-11-24 ENCOUNTER — HOSPITAL ENCOUNTER (INPATIENT)
Facility: HOSPITAL | Age: 40
LOS: 2 days | Discharge: HOME | End: 2020-11-26
Attending: OBSTETRICS & GYNECOLOGY | Admitting: OBSTETRICS & GYNECOLOGY
Payer: COMMERCIAL

## 2020-11-24 ENCOUNTER — ANESTHESIA EVENT (OUTPATIENT)
Dept: OBGYN UNIT | Facility: HOSPITAL | Age: 40
End: 2020-11-24
Payer: COMMERCIAL

## 2020-11-24 ENCOUNTER — ANESTHESIA (OUTPATIENT)
Dept: OBGYN UNIT | Facility: HOSPITAL | Age: 40
End: 2020-11-24
Payer: COMMERCIAL

## 2020-11-24 PROBLEM — Z34.90 PREGNANCY (HCC): Status: ACTIVE | Noted: 2020-11-24

## 2020-11-24 PROBLEM — Z34.90 PREGNANCY: Status: ACTIVE | Noted: 2020-11-24

## 2020-11-24 PROCEDURE — 59514 CESAREAN DELIVERY ONLY: CPT | Performed by: OBSTETRICS & GYNECOLOGY

## 2020-11-24 RX ORDER — ONDANSETRON 2 MG/ML
4 INJECTION INTRAMUSCULAR; INTRAVENOUS EVERY 6 HOURS PRN
Status: DISCONTINUED | OUTPATIENT
Start: 2020-11-24 | End: 2020-11-24 | Stop reason: HOSPADM

## 2020-11-24 RX ORDER — SIMETHICONE 80 MG
80 TABLET,CHEWABLE ORAL 3 TIMES DAILY PRN
Status: DISCONTINUED | OUTPATIENT
Start: 2020-11-24 | End: 2020-11-26

## 2020-11-24 RX ORDER — ONDANSETRON 2 MG/ML
4 INJECTION INTRAMUSCULAR; INTRAVENOUS EVERY 6 HOURS PRN
Status: DISCONTINUED | OUTPATIENT
Start: 2020-11-24 | End: 2020-11-26

## 2020-11-24 RX ORDER — MORPHINE SULFATE 2 MG/ML
INJECTION, SOLUTION INTRAMUSCULAR; INTRAVENOUS AS NEEDED
Status: DISCONTINUED | OUTPATIENT
Start: 2020-11-24 | End: 2020-11-24 | Stop reason: SURG

## 2020-11-24 RX ORDER — HYDROMORPHONE HYDROCHLORIDE 1 MG/ML
0.4 INJECTION, SOLUTION INTRAMUSCULAR; INTRAVENOUS; SUBCUTANEOUS EVERY 5 MIN PRN
Status: DISCONTINUED | OUTPATIENT
Start: 2020-11-24 | End: 2020-11-24 | Stop reason: HOSPADM

## 2020-11-24 RX ORDER — IBUPROFEN 600 MG/1
600 TABLET ORAL EVERY 6 HOURS
Status: DISCONTINUED | OUTPATIENT
Start: 2020-11-25 | End: 2020-11-26

## 2020-11-24 RX ORDER — KETOROLAC TROMETHAMINE 30 MG/ML
30 INJECTION, SOLUTION INTRAMUSCULAR; INTRAVENOUS EVERY 6 HOURS SCHEDULED
Status: DISPENSED | OUTPATIENT
Start: 2020-11-24 | End: 2020-11-25

## 2020-11-24 RX ORDER — DIPHENHYDRAMINE HYDROCHLORIDE 50 MG/ML
12.5 INJECTION INTRAMUSCULAR; INTRAVENOUS EVERY 4 HOURS PRN
Status: DISCONTINUED | OUTPATIENT
Start: 2020-11-24 | End: 2020-11-26

## 2020-11-24 RX ORDER — MELATONIN
325
COMMUNITY
End: 2021-01-20 | Stop reason: ALTCHOICE

## 2020-11-24 RX ORDER — DIPHENHYDRAMINE HCL 25 MG
25 CAPSULE ORAL EVERY 4 HOURS PRN
Status: DISCONTINUED | OUTPATIENT
Start: 2020-11-24 | End: 2020-11-26

## 2020-11-24 RX ORDER — ASPIRIN 81 MG/1
81 TABLET, CHEWABLE ORAL DAILY
COMMUNITY
End: 2020-11-30 | Stop reason: CLARIF

## 2020-11-24 RX ORDER — CLINDAMYCIN PHOSPHATE 900 MG/50ML
900 INJECTION INTRAVENOUS ONCE
Status: DISCONTINUED | OUTPATIENT
Start: 2020-11-24 | End: 2020-11-24

## 2020-11-24 RX ORDER — GENTAMICIN SULFATE 40 MG/ML
5 INJECTION, SOLUTION INTRAMUSCULAR; INTRAVENOUS ONCE
Status: DISCONTINUED | OUTPATIENT
Start: 2020-11-24 | End: 2020-11-24

## 2020-11-24 RX ORDER — ONDANSETRON 2 MG/ML
INJECTION INTRAMUSCULAR; INTRAVENOUS AS NEEDED
Status: DISCONTINUED | OUTPATIENT
Start: 2020-11-24 | End: 2020-11-24 | Stop reason: SURG

## 2020-11-24 RX ORDER — NALBUPHINE HCL 10 MG/ML
10 AMPUL (ML) INJECTION
Status: DISCONTINUED | OUTPATIENT
Start: 2020-11-24 | End: 2020-11-26

## 2020-11-24 RX ORDER — KETOROLAC TROMETHAMINE 30 MG/ML
INJECTION, SOLUTION INTRAMUSCULAR; INTRAVENOUS
Status: COMPLETED
Start: 2020-11-24 | End: 2020-11-24

## 2020-11-24 RX ORDER — SODIUM CHLORIDE 9 MG/ML
100 INJECTION, SOLUTION INTRAVENOUS ONCE
Status: DISCONTINUED | OUTPATIENT
Start: 2020-11-24 | End: 2020-11-24 | Stop reason: HOSPADM

## 2020-11-24 RX ORDER — ACETAMINOPHEN 500 MG
1000 TABLET ORAL EVERY 6 HOURS
Status: DISCONTINUED | OUTPATIENT
Start: 2020-11-24 | End: 2020-11-26

## 2020-11-24 RX ORDER — BUPIVACAINE HYDROCHLORIDE 7.5 MG/ML
INJECTION, SOLUTION INTRASPINAL AS NEEDED
Status: DISCONTINUED | OUTPATIENT
Start: 2020-11-24 | End: 2020-11-24 | Stop reason: SURG

## 2020-11-24 RX ORDER — DIPHENHYDRAMINE HYDROCHLORIDE 50 MG/ML
25 INJECTION INTRAMUSCULAR; INTRAVENOUS ONCE AS NEEDED
Status: COMPLETED | OUTPATIENT
Start: 2020-11-24 | End: 2020-11-24

## 2020-11-24 RX ORDER — DEXTROSE MONOHYDRATE 25 G/50ML
50 INJECTION, SOLUTION INTRAVENOUS
Status: DISCONTINUED | OUTPATIENT
Start: 2020-11-24 | End: 2020-11-24 | Stop reason: HOSPADM

## 2020-11-24 RX ORDER — TRISODIUM CITRATE DIHYDRATE AND CITRIC ACID MONOHYDRATE 500; 334 MG/5ML; MG/5ML
30 SOLUTION ORAL ONCE
Status: COMPLETED | OUTPATIENT
Start: 2020-11-24 | End: 2020-11-24

## 2020-11-24 RX ORDER — ACETAMINOPHEN 500 MG
1000 TABLET ORAL ONCE
Status: COMPLETED | OUTPATIENT
Start: 2020-11-24 | End: 2020-11-24

## 2020-11-24 RX ORDER — SODIUM CHLORIDE, SODIUM LACTATE, POTASSIUM CHLORIDE, CALCIUM CHLORIDE 600; 310; 30; 20 MG/100ML; MG/100ML; MG/100ML; MG/100ML
125 INJECTION, SOLUTION INTRAVENOUS CONTINUOUS
Status: DISCONTINUED | OUTPATIENT
Start: 2020-11-24 | End: 2020-11-24 | Stop reason: HOSPADM

## 2020-11-24 RX ORDER — PHENYLEPHRINE HCL 10 MG/ML
VIAL (ML) INJECTION AS NEEDED
Status: DISCONTINUED | OUTPATIENT
Start: 2020-11-24 | End: 2020-11-24 | Stop reason: SURG

## 2020-11-24 RX ORDER — NALOXONE HYDROCHLORIDE 0.4 MG/ML
0.08 INJECTION, SOLUTION INTRAMUSCULAR; INTRAVENOUS; SUBCUTANEOUS
Status: ACTIVE | OUTPATIENT
Start: 2020-11-24 | End: 2020-11-25

## 2020-11-24 RX ORDER — ENOXAPARIN SODIUM 100 MG/ML
40 INJECTION SUBCUTANEOUS EVERY EVENING
Status: DISCONTINUED | OUTPATIENT
Start: 2020-11-24 | End: 2020-11-26

## 2020-11-24 RX ORDER — SODIUM CHLORIDE, SODIUM LACTATE, POTASSIUM CHLORIDE, CALCIUM CHLORIDE 600; 310; 30; 20 MG/100ML; MG/100ML; MG/100ML; MG/100ML
INJECTION, SOLUTION INTRAVENOUS CONTINUOUS
Status: DISCONTINUED | OUTPATIENT
Start: 2020-11-24 | End: 2020-11-26

## 2020-11-24 RX ORDER — ONDANSETRON 2 MG/ML
4 INJECTION INTRAMUSCULAR; INTRAVENOUS ONCE AS NEEDED
Status: DISCONTINUED | OUTPATIENT
Start: 2020-11-24 | End: 2020-11-24 | Stop reason: HOSPADM

## 2020-11-24 RX ORDER — DEXAMETHASONE SODIUM PHOSPHATE 4 MG/ML
VIAL (ML) INJECTION AS NEEDED
Status: DISCONTINUED | OUTPATIENT
Start: 2020-11-24 | End: 2020-11-24 | Stop reason: SURG

## 2020-11-24 RX ORDER — ZOLPIDEM TARTRATE 5 MG/1
5 TABLET ORAL NIGHTLY PRN
Status: DISCONTINUED | OUTPATIENT
Start: 2020-11-24 | End: 2020-11-26

## 2020-11-24 RX ORDER — GABAPENTIN 300 MG/1
300 CAPSULE ORAL EVERY 8 HOURS PRN
Status: DISCONTINUED | OUTPATIENT
Start: 2020-11-24 | End: 2020-11-26

## 2020-11-24 RX ORDER — BISACODYL 10 MG
10 SUPPOSITORY, RECTAL RECTAL
Status: DISCONTINUED | OUTPATIENT
Start: 2020-11-24 | End: 2020-11-26

## 2020-11-24 RX ORDER — DOCUSATE SODIUM 100 MG/1
100 CAPSULE, LIQUID FILLED ORAL
Status: DISCONTINUED | OUTPATIENT
Start: 2020-11-25 | End: 2020-11-26

## 2020-11-24 RX ORDER — DIPHENHYDRAMINE HYDROCHLORIDE 50 MG/ML
INJECTION INTRAMUSCULAR; INTRAVENOUS
Status: DISPENSED
Start: 2020-11-24 | End: 2020-11-24

## 2020-11-24 RX ORDER — DEXTROSE, SODIUM CHLORIDE, SODIUM LACTATE, POTASSIUM CHLORIDE, AND CALCIUM CHLORIDE 5; .6; .31; .03; .02 G/100ML; G/100ML; G/100ML; G/100ML; G/100ML
INJECTION, SOLUTION INTRAVENOUS CONTINUOUS PRN
Status: DISCONTINUED | OUTPATIENT
Start: 2020-11-24 | End: 2020-11-26

## 2020-11-24 RX ORDER — KETOROLAC TROMETHAMINE 30 MG/ML
30 INJECTION, SOLUTION INTRAMUSCULAR; INTRAVENOUS ONCE AS NEEDED
Status: COMPLETED | OUTPATIENT
Start: 2020-11-24 | End: 2020-11-24

## 2020-11-24 RX ORDER — ENOXAPARIN SODIUM 100 MG/ML
40 INJECTION SUBCUTANEOUS EVERY EVENING
Status: DISCONTINUED | OUTPATIENT
Start: 2020-11-24 | End: 2020-11-24

## 2020-11-24 RX ADMIN — PHENYLEPHRINE HCL 50 MCG: 10 MG/ML VIAL (ML) INJECTION at 09:43:00

## 2020-11-24 RX ADMIN — ONDANSETRON 4 MG: 2 INJECTION INTRAMUSCULAR; INTRAVENOUS at 09:10:00

## 2020-11-24 RX ADMIN — BUPIVACAINE HYDROCHLORIDE 1.6 ML: 7.5 INJECTION, SOLUTION INTRASPINAL at 08:58:00

## 2020-11-24 RX ADMIN — MORPHINE SULFATE 0.1 MG: 2 INJECTION, SOLUTION INTRAMUSCULAR; INTRAVENOUS at 08:58:00

## 2020-11-24 RX ADMIN — DEXAMETHASONE SODIUM PHOSPHATE 4 MG: 4 MG/ML VIAL (ML) INJECTION at 09:10:00

## 2020-11-24 RX ADMIN — PHENYLEPHRINE HCL 50 MCG: 10 MG/ML VIAL (ML) INJECTION at 09:17:00

## 2020-11-24 NOTE — ANESTHESIA PREPROCEDURE EVALUATION
PRE-OP EVALUATION    Patient Name: Naomie Brown    Pre-op Diagnosis: 38.1 week IUP, previous , GDM, SROM    Procedure(s):      Surgeon(s) and Role:     * Barbara Arellano MD - Primary     * Gama Lundy MD - Assisting Surgeon    Pre L5-S1 lami disk    •   2010   • COLONOSCOPY, POSSIBLE BIOPSY, POSSIBLE POLYPECTOMY 14381 N/A 2019    Performed by Bibiana Castellon MD at [place] [address] 12/3/2014    Performed by Thomas Banuelos MD at [facility]  ETT.  Plan/risks discussed with: patient and spouse                Present on Admission:  **None**

## 2020-11-24 NOTE — PROGRESS NOTES
Pt , edc 20 (38 1/7wks) admitted to triage rm 4 with srom @ 0545. Pt leaking large amounts of clear/light yellow fluid. Pt states + fetal movement, occas ctx that she feels in her back, denies vag bleeding. efm tested, explained & applied.  Pt sche

## 2020-11-24 NOTE — PLAN OF CARE
Problem: BIRTH - VAGINAL/ SECTION  Goal: Fetal and maternal status remain reassuring during the birth process  Description: INTERVENTIONS:  - Monitor vital signs  - Monitor fetal heart rate  - Monitor uterine activity  - Monitor labor progression Goal: Patient's Short Term Goal: Understand activity parameters appropriate for diagnosis.     Interventions:   - Education and patient demonstrates comprehension.  - Patient verbalizes understanding.   - See additional Care Plan goals for specific interven

## 2020-11-24 NOTE — ANESTHESIA PROCEDURE NOTES
Spinal Block  Performed by: Adria Zeng MD  Authorized by: Adria Zeng MD       General Information and Staff    Start Time:  11/24/2020 8:48 AM  End Time:  11/24/2020 8:58 AM  Anesthesiologist:  Adria Zeng MD  Performed by:   Anesthesiologist

## 2020-11-24 NOTE — OPERATIVE REPORT
OPERATIVE REPORT  [address]   Preoperative Diagnosis: 38.1 week IUP, previous , GDM, SROM  Postoperative Diagnosis: same  Primary surgeon:  Surgeon(s) and Role:     * Kiana Arellano MD - Primary     * Norberto Reed MD - Assisting S using a wet lap. The bladder blade was replaced. The first layer of the hysterotomy was closed using 0 vicryl in a running, locking fashion. The incision was inspected for hemostasis.  A figure of eight suture of 2.0 Vicryl was placed to assure hemostasis

## 2020-11-24 NOTE — H&P
[address] and Delivery Prenatal History and Physical Interval Addendum  Please see full Prenatal Record for this pregnancy      SUBJECTIVE:    Interval History:      This is a pregnancy at 45 1/7 weeks admitted for RCS with SROM today  Previous

## 2020-11-24 NOTE — ANESTHESIA POSTPROCEDURE EVALUATION
[id number] [facility] Patient Status:  Inpatient   Age/Gender 36year old female MRN [id number]   Location [address] Attending Ten Arellano MD   Hosp Day # 0 PCP Kanu Plummer MD       Anesthesia Post-op Note

## 2020-11-25 RX ORDER — DEXTROSE MONOHYDRATE 25 G/50ML
50 INJECTION, SOLUTION INTRAVENOUS
Status: DISCONTINUED | OUTPATIENT
Start: 2020-11-25 | End: 2020-11-26

## 2020-11-25 NOTE — PROGRESS NOTES
Postop Day 1    Pt without complaints.      Temp: 98.2 °F (36.8 °C)  Pulse: 87  Resp: 17  BP: 107/76    Intake/Output Summary (Last 24 hours) at 11/25/2020 0839  Last data filed at 11/25/2020 0604  Gross per 24 hour   Intake 2600 ml   Output 1825 ml   Net 7

## 2020-11-25 NOTE — CONSULTS
[facility]    PATIENT'S NAME: Mainor Balderrama   ATTENDING PHYSICIAN: STEFANIA Diaz Petties: Daniel Schulte M.D.    PATIENT ACCOUNT#:   [de-identified]    LOCATION:  [address] [place] [address]  MEDICAL RECORD #:   [id number]       DATE OF BI She was on metformin previously prior to pregnancy and can resume that upon discharge. I encouraged her to resume her exercise regimen and weight loss efforts as soon as she is able and medically cleared to do so.   She is well-versed with a carb-controlle

## 2020-11-25 NOTE — PROGRESS NOTES
Mom reminded to place infant in bassinet/or send baby to nursery if she felt tired while holding baby on chest, so infant would not be subject to falling off bed

## 2020-11-25 NOTE — PROGRESS NOTES
[address] 2SW-J [address] [place] Patient Status:  Inpatient   Age/Gender 36year old female MRN [id number]   [facility] 2SW-J Attending Asaf Arellano MD   Hosp Day # 1 PCP Krista Baumgarten, MD      Anesthesia Pain Pr

## 2020-11-26 VITALS
OXYGEN SATURATION: 97 % | HEIGHT: 69 IN | RESPIRATION RATE: 16 BRPM | DIASTOLIC BLOOD PRESSURE: 76 MMHG | HEART RATE: 70 BPM | TEMPERATURE: 99 F | SYSTOLIC BLOOD PRESSURE: 139 MMHG | BODY MASS INDEX: 43.4 KG/M2 | WEIGHT: 293 LBS

## 2020-11-26 NOTE — DISCHARGE SUMMARY
Admission date: 2020  Discharge date: 2020  Admission diagnosis: term pregnancy, previous  section  Discharge diagnosis: same  Operative Procedure: repeat low transverse  section  Hospital course: uncomplicated  Discharge medica

## 2020-11-26 NOTE — PROGRESS NOTES
Postpartum Progress Note    SUBJECTIVE:    Post-op day #2 s/p repeat  section. No overnight events. No complaints. Ambulating, tolerating PO, voiding, + flatus. Breastfeeding.       OBJECTIVE:    Vital signs in last 24 hours:  Temp:  [98.4 °F

## 2020-11-26 NOTE — PROGRESS NOTES
[facility]  Progress Note    Soheila Diss Patient Status:  Inpatient    1980 MRN [id number]   [facility] 2SW-J Attending Baldomero Arellano MD   Hosp Day # 2 PCP Austin Duggan MD     Assessment/Plan:  Patient Active Prob 1794    HEENT: Exam is unremarkable. Neck: Supple. Lungs: Clear bilaterally. Cardiac: Regular rate and rhythm. Abdomen: Bowel sounds present, normoactive. Nontender. Extremities:  No lower extremity edema noted.         Labs:  Recent Labs     11/24/2

## 2020-11-26 NOTE — PLAN OF CARE
Problem: POSTPARTUM  Goal: Long Term Goal:Experiences normal postpartum course  Description: INTERVENTIONS:  - Assess and monitor vital signs and lab values. - Assess fundus and lochia. - Provide ice/sitz baths for perineum discomfort.   - Monitor heali specific medications or substances incompatible with breast feeding.  - Assess and monitor for signs of nipple pain/trauma. - Instruct and provide assistance with proper latch.   - Review techniques for milk expression (breast pumping) and storage of breas

## 2020-11-26 NOTE — PLAN OF CARE
Problem: POSTPARTUM  Goal: Long Term Goal:Experiences normal postpartum course  Description: INTERVENTIONS:  - Assess and monitor vital signs and lab values. - Assess fundus and lochia. - Provide ice/sitz baths for perineum discomfort.   - Monitor heali substances incompatible with breast feeding.  - Assess and monitor for signs of nipple pain/trauma. - Instruct and provide assistance with proper latch. - Review techniques for milk expression (breast pumping) and storage of breast milk.  Provide pumping

## 2020-11-30 ENCOUNTER — APPOINTMENT (OUTPATIENT)
Dept: GENERAL RADIOLOGY | Facility: HOSPITAL | Age: 40
DRG: 776 | End: 2020-11-30
Attending: EMERGENCY MEDICINE
Payer: COMMERCIAL

## 2020-11-30 ENCOUNTER — TELEPHONE (OUTPATIENT)
Dept: OBGYN UNIT | Facility: HOSPITAL | Age: 40
End: 2020-11-30

## 2020-11-30 ENCOUNTER — APPOINTMENT (OUTPATIENT)
Dept: CT IMAGING | Facility: HOSPITAL | Age: 40
DRG: 776 | End: 2020-11-30
Attending: EMERGENCY MEDICINE
Payer: COMMERCIAL

## 2020-11-30 ENCOUNTER — HOSPITAL ENCOUNTER (INPATIENT)
Facility: HOSPITAL | Age: 40
LOS: 2 days | Discharge: HOME | DRG: 776 | End: 2020-12-02
Attending: EMERGENCY MEDICINE | Admitting: INTERNAL MEDICINE
Payer: COMMERCIAL

## 2020-11-30 DIAGNOSIS — R06.00 DYSPNEA ON EXERTION: Primary | ICD-10-CM

## 2020-11-30 PROBLEM — R06.09 DYSPNEA ON EXERTION: Status: ACTIVE | Noted: 2020-11-30

## 2020-11-30 PROCEDURE — 71275 CT ANGIOGRAPHY CHEST: CPT | Performed by: EMERGENCY MEDICINE

## 2020-11-30 PROCEDURE — 93005 ELECTROCARDIOGRAM TRACING: CPT

## 2020-11-30 PROCEDURE — 71045 X-RAY EXAM CHEST 1 VIEW: CPT | Performed by: EMERGENCY MEDICINE

## 2020-11-30 PROCEDURE — 85025 COMPLETE CBC W/AUTO DIFF WBC: CPT | Performed by: EMERGENCY MEDICINE

## 2020-11-30 PROCEDURE — 93010 ELECTROCARDIOGRAM REPORT: CPT

## 2020-11-30 PROCEDURE — 99285 EMERGENCY DEPT VISIT HI MDM: CPT

## 2020-11-30 PROCEDURE — 87086 URINE CULTURE/COLONY COUNT: CPT | Performed by: EMERGENCY MEDICINE

## 2020-11-30 PROCEDURE — 84550 ASSAY OF BLOOD/URIC ACID: CPT | Performed by: EMERGENCY MEDICINE

## 2020-11-30 PROCEDURE — 84484 ASSAY OF TROPONIN QUANT: CPT | Performed by: EMERGENCY MEDICINE

## 2020-11-30 PROCEDURE — 81001 URINALYSIS AUTO W/SCOPE: CPT | Performed by: EMERGENCY MEDICINE

## 2020-11-30 PROCEDURE — 83880 ASSAY OF NATRIURETIC PEPTIDE: CPT | Performed by: EMERGENCY MEDICINE

## 2020-11-30 PROCEDURE — 80053 COMPREHEN METABOLIC PANEL: CPT | Performed by: EMERGENCY MEDICINE

## 2020-11-30 PROCEDURE — 96374 THER/PROPH/DIAG INJ IV PUSH: CPT

## 2020-11-30 RX ORDER — SERTRALINE HYDROCHLORIDE 25 MG/1
50 TABLET, FILM COATED ORAL DAILY
COMMUNITY
End: 2021-04-20 | Stop reason: ALTCHOICE

## 2020-11-30 RX ORDER — FUROSEMIDE 10 MG/ML
40 INJECTION INTRAMUSCULAR; INTRAVENOUS ONCE
Status: COMPLETED | OUTPATIENT
Start: 2020-11-30 | End: 2020-11-30

## 2020-11-30 RX ORDER — FUROSEMIDE 10 MG/ML
20 INJECTION INTRAMUSCULAR; INTRAVENOUS DAILY
Status: DISCONTINUED | OUTPATIENT
Start: 2020-12-01 | End: 2020-12-01

## 2020-11-30 RX ORDER — HEPARIN SODIUM 5000 [USP'U]/ML
5000 INJECTION, SOLUTION INTRAVENOUS; SUBCUTANEOUS EVERY 8 HOURS SCHEDULED
Status: DISCONTINUED | OUTPATIENT
Start: 2020-11-30 | End: 2020-12-02

## 2020-11-30 RX ORDER — INSULIN LISPRO 100 [IU]/ML
INJECTION, SOLUTION INTRAVENOUS; SUBCUTANEOUS SEE ADMIN INSTRUCTIONS
COMMUNITY
End: 2020-11-30 | Stop reason: CLARIF

## 2020-11-30 RX ORDER — ACETAMINOPHEN 325 MG/1
650 TABLET ORAL EVERY 6 HOURS PRN
Status: DISCONTINUED | OUTPATIENT
Start: 2020-11-30 | End: 2020-12-02

## 2020-12-01 ENCOUNTER — APPOINTMENT (OUTPATIENT)
Dept: CT IMAGING | Facility: HOSPITAL | Age: 40
DRG: 776 | End: 2020-12-01
Attending: INTERNAL MEDICINE
Payer: COMMERCIAL

## 2020-12-01 ENCOUNTER — APPOINTMENT (OUTPATIENT)
Dept: CV DIAGNOSTICS | Facility: HOSPITAL | Age: 40
DRG: 776 | End: 2020-12-01
Attending: HOSPITALIST
Payer: COMMERCIAL

## 2020-12-01 PROCEDURE — 93306 TTE W/DOPPLER COMPLETE: CPT | Performed by: HOSPITALIST

## 2020-12-01 PROCEDURE — 70450 CT HEAD/BRAIN W/O DYE: CPT | Performed by: INTERNAL MEDICINE

## 2020-12-01 PROCEDURE — 84145 PROCALCITONIN (PCT): CPT | Performed by: INTERNAL MEDICINE

## 2020-12-01 PROCEDURE — 80048 BASIC METABOLIC PNL TOTAL CA: CPT | Performed by: HOSPITALIST

## 2020-12-01 PROCEDURE — 85025 COMPLETE CBC W/AUTO DIFF WBC: CPT | Performed by: HOSPITALIST

## 2020-12-01 RX ORDER — BUTALBITAL, ASPIRIN, AND CAFFEINE 50; 325; 40 MG/1; MG/1; MG/1
1 CAPSULE ORAL EVERY 4 HOURS PRN
Status: DISCONTINUED | OUTPATIENT
Start: 2020-12-01 | End: 2020-12-01

## 2020-12-01 RX ORDER — IBUPROFEN 600 MG/1
600 TABLET ORAL ONCE
Status: COMPLETED | OUTPATIENT
Start: 2020-12-01 | End: 2020-12-01

## 2020-12-01 RX ORDER — BUTALBITAL, ACETAMINOPHEN AND CAFFEINE 50; 325; 40 MG/1; MG/1; MG/1
1 TABLET ORAL EVERY 4 HOURS PRN
Status: DISCONTINUED | OUTPATIENT
Start: 2020-12-01 | End: 2020-12-02

## 2020-12-01 RX ORDER — HYDROCODONE BITARTRATE AND ACETAMINOPHEN 5; 325 MG/1; MG/1
1 TABLET ORAL EVERY 6 HOURS PRN
Status: DISCONTINUED | OUTPATIENT
Start: 2020-12-01 | End: 2020-12-02

## 2020-12-01 RX ORDER — POTASSIUM CHLORIDE 20 MEQ/1
40 TABLET, EXTENDED RELEASE ORAL EVERY 4 HOURS
Status: COMPLETED | OUTPATIENT
Start: 2020-12-01 | End: 2020-12-01

## 2020-12-01 RX ORDER — FUROSEMIDE 10 MG/ML
40 INJECTION INTRAMUSCULAR; INTRAVENOUS ONCE
Status: COMPLETED | OUTPATIENT
Start: 2020-12-01 | End: 2020-12-01

## 2020-12-01 RX ORDER — FUROSEMIDE 10 MG/ML
20 INJECTION INTRAMUSCULAR; INTRAVENOUS ONCE
Status: COMPLETED | OUTPATIENT
Start: 2020-12-01 | End: 2020-12-01

## 2020-12-01 RX ORDER — LABETALOL 100 MG/1
100 TABLET, FILM COATED ORAL EVERY 12 HOURS SCHEDULED
Status: DISCONTINUED | OUTPATIENT
Start: 2020-12-01 | End: 2020-12-02

## 2020-12-01 RX ORDER — IBUPROFEN 400 MG/1
400 TABLET ORAL EVERY 6 HOURS PRN
Status: DISCONTINUED | OUTPATIENT
Start: 2020-12-01 | End: 2020-12-01

## 2020-12-01 NOTE — PLAN OF CARE
Assumed care at 0730 this AM. Pt alert and oriented, rating a 8/10 HA that is at the forehead, norco given with some relief, pain decreased to 6/10. Procal neg,  COVID sent per Dr. Mook Roldan order, contact/droplet isolation initiated.  POD#7 s/p C-S rate control medications as ordered  - Initiate emergency measures for life threatening arrhythmias  - Monitor electrolytes and administer replacement therapy as ordered  Outcome: Progressing     Problem: RESPIRATORY - ADULT  Goal: Achieves optimal ventila

## 2020-12-01 NOTE — PROGRESS NOTES
[facility] Pharmacy Note: Antimicrobial Weight Based Dose Adjustment for: ceftriaxone (ROCEPHIN)    Andre Hennessy is a 36year old patient who has been prescribed ceftriaxone (ROCEPHIN) 1000 mg every 24 hours.       Estimated Creatinine Clearance: 102.8 mL/

## 2020-12-01 NOTE — H&P
[facility] Hospitalist History and Physical      Patient presents with:  Difficulty Breathing       PCP: Tima Rehman MD      History of Present Illness: Patient is a 36year old female with PMH sig for HTN, HL she underwent csection at 38 weeks.     She was Performed by Connie Barrios MD at [address] [place] [address] N/A 3/21/2012    Performed by Connie Barrios MD at [address] [place] [address] N/A 2/29/2012    Performed by Connie Barrios MD at [address] [place] [address]  normal, atraumatic, no cyanosis or edema. Skin: Skin color, texture, turgor normal. No rashes or lesions.     Neurologic: Normal strength, no focal deficit appreciated     Data Review:    LABS:   Lab Results   Component Value Date    WBC 9.2 12/01/2020 thoracic spine. CONCLUSION:  1. No acute pulmonary embolus to the segmental level. 2. Ground-glass opacities bilaterally, greatest within the lower lobes suggestive of pneumonia.     Dictated by (CST): Hao Levine MD on 11/30/2020 at 6:49 P base.  No pleural effusions          CONCLUSION:  Scattered pulmonary opacities, greatest within the right lung base, suggestive of pneumonia.,    Dictated by (CST): Julio Chen MD on 11/30/2020 at 9:06 PM     Finalized by (CST): Julio Chen MD o

## 2020-12-01 NOTE — PLAN OF CARE
NURSING ADMISSION NOTE      Patient admitted via Cart  Oriented to room. Safety precautions initiated. Bed in low position. Call light in reach. Received patient from ER. Patient a&ox4. SR on tele. 2LO2. .  States shortness of breath is improve

## 2020-12-01 NOTE — CONSULTS
[facility] Cardiology Consultation    [facility] Patient Status:  Inpatient    1980 MRN [id number]   [facility] 2NE-A Attending Donovan Manrique MD   Hosp Day # 1 PCP Lyudmila Salmon MD     Reason for Consultation:  Heart fa Performed by Aracely Rod MD at [address] [place] [address] N/A 10/15/2014    Performed by Aracely Rod MD at [address] [place] [address] N/A 5/28/2014    Performed by Aracely Rod MD at [facility] Soft, non-tender. Extremities: 1+ edema  Neurologic: no focal deficits  Skin: Warm and dry.           Telemetry: sinus    Laboratories and Data:  Diagnostics:    EKG, 12/1/2020:  IRBBB, normal    CXR, 12/1/2020:  reviewed    Labs:   HEM:  Recent Labs   La

## 2020-12-01 NOTE — PAYOR COMM NOTE
--------------  12/1  CONTINUED STAY REVIEW    Payor: Lisa Ibrahim LABOR [employer] PPO  Subscriber #:  [id number]  Authorization Number: [id number]     [facility] Hospitalist History and Physical       Patient presents with:  Difficulty Breathing        PCP: Rubi Beckwith Maeve Stern MD at [address] [place] [address] N/A 5/28/2014     Performed by Maeve Stern MD at [address] [place] [address] N/A 4/9/2012     Performed by Cassie Huber MD at [address] [place] [address] deformity. Heart:  Regular rate and rhythm, S1, S2 normal, no murmur, rub or gallop appreciated   Abdomen:   Soft, non-tender. Bowel sounds normal. No masses,  No organomegaly.  Non distended   Extremities: Extremities normal, atraumatic, no cyanosis or e hilar adenopathy. PLEURA:  Tiny right-sided pleural effusion. CARDIAC:  No pericardial effusion. CHEST WALL:  Normal. LIMITED ABDOMEN:  Hepatic steatosis. Hepatomegaly measuring 28.2 cm in longest dimension.  BONES:  Degenerative changes of the thoracic sp STATED HISTORY: (As transcribed by Technologist)  Patient presents post partum with chest pain, shortness of breath and dizziness. FINDINGS:  Stable cardiac size. No pneumothorax.   Scattered opacities are noted most prominent within the right lung base HYDROcodone-acetaminophen (NORCO) 5-325 MG per tab 1 tablet     Date Action Dose Route User    12/1/2020 1219 Given 1 tablet Oral Ana Plummer, RN      ibuprofen (MOTRIN) tab 600 mg     Date Action Dose Route User    12/1/2020 0534 Given 600 mg Oral

## 2020-12-01 NOTE — PROGRESS NOTES
Ana RN called  to ask about whether pt can breastfeed after receiving Omnipaque contrast and getting Rocephin antibiotic. Information discussed from Liz's reference Medication and Mother's Milk, Omnipaque L2 and Rocephin L1.  Tubed reference informati

## 2020-12-01 NOTE — ED NOTES
Patient still felt SOB oxygen sat's on room air 90-92%, placed patient on 2 l NC will continue to monitor.

## 2020-12-01 NOTE — PAYOR COMM NOTE
--------------  ADMISSION REVIEW     Payor: Kelley Nissen LABOR FUN[employer] PPO  Subscriber #:  [id number]  Authorization Number: [id number]    Admit date: 11/30/20  Admit time: 2204       Patient Seen in: [facility] Emergency Department    History   Patient pres MD CLAUDE at [address] [facility] L+D OR   • COLONOSCOPY, POSSIBLE BIOPSY, POSSIBLE POLYPECTOMY [address] N/A 8/12/2019    Performed by Segundo Banuelos MD at [place] [address] 12/3/2014    Performed by Rahda Wilson MD at [address] [place] [address] lymphadenopathy. LUNG: Lungs clear to auscultation bilaterally, no wheezing, no rales, no rhonchi. CARDIOVASCULAR: Regular rate and rhythm. Normal S1S2. No S3S4 or murmur. ABDOMEN: Bowel sounds are present. Soft. nondistended, no pulsatile masses.  no Xr Chest Ap Portable    Scattered pulmonary opacities, greatest within the right lung base, suggestive of pneumonia.,        Medications   iohexol (OMNIPAQUE) 350 MG/ML injection 100 mL (100 mL Intravenous Given 11/30/20 1829)   furosemide (LASIX) inje LMP 03/02/2020 (Approximate)   SpO2 97%   BMI 38.79 kg/m²   General:  Alert, no distress, appears stated age. Head:  Normocephalic, without obvious abnormality, atraumatic. Eyes:  Sclera anicteric, No conjunctival pallor, EOMs intact.     Nose: Nares n hyperlipidemia. She had c section at 28 weeks gestation on 11/24/20. All went well, but over the past several days she has been dyspneic and edematous. She had been off BP meds during pregnancy and BP was not an issue. I the ER, her BP was 180/99.   CXR 12/01/20 0431 98.3 °F (36.8 °C) — 22 — — — Nasal cannula 2 L/min   12/01/20 0431 — 70 — 148/81 97 % — — —   12/01/20 0053 98.6 °F (37 °C) 65 18 138/72 97 % — Nasal cannula 2 L/min   11/30/20 2229 — 74 18 161/88Abnormal  97 % 262 lb 11.2 oz Nasal cannula iohexol (OMNIPAQUE) 350 MG/ML injection 100 mL     Date Action Dose Route User    11/30/2020 1829 Given 100 mL Intravenous Magolan, Nahed A      Sertraline HCl (ZOLOFT) tab 50 mg     Date Action Dose Route User    12/1/2020 0855 Given 50 mg Oral [place]

## 2020-12-01 NOTE — ED PROVIDER NOTES
Patient Seen in: [facility] Emergency Department      History   Patient presents with:  Difficulty Breathing    Stated Complaint: post partum with chest pain / shortness of breath and dizziness / sent by OB    HPI    Patient is a 27-year-old female EPIDURAL N/A 12/3/2014    Performed by Violetta Mora MD at [address] [place] [address] N/A 10/30/2014    Performed by Violetta Mora MD at [address] [place] [address] N/A 10/15/2014    Performed by Bernarda Espinoza accommodation, extraocular motion is intact, sclerae white, conjunctiva is pink. Oropharynx is unremarkable, no exudate. NECK: Supple, trachea midline, no lymphadenopathy.    LUNG: Lungs clear to auscultation bilaterally, no wheezing, no rales, no rhonchi created for panel order CBC WITH DIFFERENTIAL WITH PLATELET.   Procedure                               Abnormality         Status                     ---------                               -----------         ------                     CBC W/ DIFFERENTIAL[ level.  2. Ground-glass opacities bilaterally, greatest within the lower lobes suggestive of pneumonia.     Dictated by (CST): Hang Florence MD on 11/30/2020 at 6:49 PM     Finalized by (CST): Hang Florence MD on 11/30/2020 at 6:53 PM       Us Kidneys greatest within the right lung base, suggestive of pneumonia.,    Dictated by (CST): Yamila Hunter MD on 11/30/2020 at 9:06 PM     Finalized by (CST): Yamila Hunter MD on 11/30/2020 at 9:07 PM         Medications   iohexol (OMNIPAQUE) 350 MG/ML injec

## 2020-12-02 VITALS
RESPIRATION RATE: 18 BRPM | BODY MASS INDEX: 37.03 KG/M2 | OXYGEN SATURATION: 97 % | SYSTOLIC BLOOD PRESSURE: 147 MMHG | HEART RATE: 74 BPM | WEIGHT: 250 LBS | DIASTOLIC BLOOD PRESSURE: 90 MMHG | TEMPERATURE: 98 F | HEIGHT: 69 IN

## 2020-12-02 PROCEDURE — 80048 BASIC METABOLIC PNL TOTAL CA: CPT | Performed by: INTERNAL MEDICINE

## 2020-12-02 RX ORDER — LABETALOL 100 MG/1
100 TABLET, FILM COATED ORAL EVERY 12 HOURS SCHEDULED
Qty: 60 TABLET | Refills: 3 | Status: SHIPPED | OUTPATIENT
Start: 2020-12-02 | End: 2021-06-04

## 2020-12-02 RX ORDER — POTASSIUM CHLORIDE 20 MEQ/1
40 TABLET, EXTENDED RELEASE ORAL ONCE
Status: COMPLETED | OUTPATIENT
Start: 2020-12-02 | End: 2020-12-02

## 2020-12-02 RX ORDER — FUROSEMIDE 10 MG/ML
40 INJECTION INTRAMUSCULAR; INTRAVENOUS ONCE
Status: COMPLETED | OUTPATIENT
Start: 2020-12-02 | End: 2020-12-02

## 2020-12-02 NOTE — DISCHARGE SUMMARY
General Medicine Discharge Summary     Patient ID:  Javed Marsh  36year old  7/28/1980    Admit date: 11/30/2020    Discharge date and time: 12/2/20    Attending Physician: Dyan Parson DO Patient has severe headache after having c-sect last week. FINDINGS:  VENTRICLES/SULCI:  Ventricles and sulci are normal in size. INTRACRANIAL:  There are no abnormal extraaxial fluid collections. There is no midline shift.   There are no intraparenchy pleural effusion. CARDIAC:  No pericardial effusion. CHEST WALL:  Normal. LIMITED ABDOMEN:  Hepatic steatosis. Hepatomegaly measuring 28.2 cm in longest dimension. BONES:  Degenerative changes of the thoracic spine. CONCLUSION:  1.  No acute pul [phone] ---------------------------------------------------------------------------- Transthoracic Echocardiogram Name:Bessie Stoner Date: 2020 :  1980 Ht:  (69in)  BP: 151 / 79 MRN:  [id number] Right ventricle: The cavity size was normal. Wall thickness was normal. Systolic function was normal. Systolic pressure was mildly increased. Right atrium:  The atrium was normal in size. Mitral valve:   Structurally normal valve.    Leaflet separation was (H)     1.0   cm     0.6 - 0.9  IVS/LV PW ratio, ED, PLAX                       0.97         ---------  LV ejection fraction                            61    %      >=55  LV E/e', lateral                                11           ---------  LV 5     mm Hg  ---------   Right ventricle                                 Value        Reference  RV pressure, S, DP                              39    mm Hg  --------- Legend: (L)  and  (H)  gagandeep values outside specified reference range. ----- Alert oriented.        Total time coordinating care for discharge: Greater than 30 minutes    Benjie Aquino DO  [facility] Hospitalist

## 2020-12-02 NOTE — PLAN OF CARE
Received patient at 0730. Alert and Oriented x4. Tele Rhythm NSR. O2 saturation 96% On room air, SOB with exertion has improved. Breath sounds dim but clear, dry cough with deep breath. Bed is locked and in low position.  Call light and personal items withi Problem: RESPIRATORY - ADULT  Goal: Achieves optimal ventilation and oxygenation  Description: INTERVENTIONS:  - Assess for changes in respiratory status  - Assess for changes in mentation and behavior  - Position to facilitate oxygenation and minimize r

## 2020-12-02 NOTE — PLAN OF CARE
Pt is Ok to discharge per primary and consults. Pt given info on labetalol and going home with hypertension. Pt instructed to keep log of BP and daily weights at home. DC weight is 250 lbs.  Discharge instructions including medications and follow ups given

## 2020-12-02 NOTE — PLAN OF CARE
Patient a&ox4. SR on tele. Lungs diminished on RA. Denies any pain at present. States headache is resolved with Fioricet and norco. Bmp in am. IV lasix BID. Daily weight. Covid PCR pending. Isolation precautions in place.  Patient updated on poc, questions

## 2020-12-02 NOTE — PROGRESS NOTES
Impression: Exdtve age-rel mclr degn, right eye, with inact chrdl neovas: H35.0914. Plan: OCT ordered and performed today. Discussed diagnosis with patient. Additional treatment is not recommended at this time but we will continue to monitor frequently. The patient was advised to continue AREDS multi-vitamins, monitor the amsler grid closely, monitor vision one eye at a time. Call immediately with any vision changes. Patient agrees with plan. [facility]aved [facility] Cardiology Progress Note        Kelley Jeffery Patient Status:  Inpatient    1980 MRN [id number]   [facility] 2NE-A Attending Franky [place], [address] [facility] Day # 2 PCP Karel Ortiz MD     Subject ALT 61*   AST 96*   ALB 2.6*       Recent Labs   Lab 11/30/20  1808   TROP <0.045       No results for input(s): PTP, INR in the last 168 hours. Impression:  1. Post partum heart failure - Acute diastolic in setting of HTN.   Echo with preser

## 2020-12-03 NOTE — PAYOR COMM NOTE
Payor:  [employer] PPO  Subscriber #:  [id number]  Authorization Number: [id number]    Admit date: 11/30/20  Admit time:  2204  Discharge Date: 12/2/2020

## 2020-12-11 PROBLEM — I50.32 CHRONIC DIASTOLIC CHF (CONGESTIVE HEART FAILURE) (HCC): Status: ACTIVE | Noted: 2020-12-11

## 2020-12-11 PROBLEM — I10 ESSENTIAL HYPERTENSION: Status: ACTIVE | Noted: 2020-12-11

## 2021-01-29 PROBLEM — M54.50 ACUTE BILATERAL LOW BACK PAIN WITHOUT SCIATICA: Status: ACTIVE | Noted: 2021-01-29

## 2021-01-29 PROBLEM — M46.1 SACROILIAC INFLAMMATION (HCC): Status: ACTIVE | Noted: 2021-01-29

## 2021-02-05 PROBLEM — Z98.890 HISTORY OF LUMBAR LAMINECTOMY: Status: ACTIVE | Noted: 2021-02-05

## 2021-02-05 PROBLEM — M54.16 LUMBAR RADICULITIS: Status: ACTIVE | Noted: 2021-02-05

## 2021-02-11 PROBLEM — M51.26 HNP (HERNIATED NUCLEUS PULPOSUS), LUMBAR: Status: ACTIVE | Noted: 2021-02-11

## 2021-03-04 PROBLEM — F41.8 DEPRESSION WITH ANXIETY: Status: ACTIVE | Noted: 2021-03-04

## 2021-03-04 PROBLEM — O34.219 PREVIOUS CESAREAN DELIVERY AFFECTING PREGNANCY (HCC): Status: RESOLVED | Noted: 2020-05-29 | Resolved: 2021-03-04

## 2021-03-04 PROBLEM — O34.219 PREVIOUS CESAREAN DELIVERY AFFECTING PREGNANCY: Status: RESOLVED | Noted: 2020-05-29 | Resolved: 2021-03-04

## 2021-05-26 PROBLEM — Z98.890 S/P GASTROPLASTY: Status: ACTIVE | Noted: 2021-05-26

## 2021-08-03 PROBLEM — R06.00 DYSPNEA ON EXERTION: Status: RESOLVED | Noted: 2020-11-30 | Resolved: 2021-08-03

## 2021-08-03 PROBLEM — E66.812 OBESITY, CLASS II, BMI 35-39.9: Status: ACTIVE | Noted: 2021-06-07

## 2021-08-03 PROBLEM — M54.50 ACUTE BILATERAL LOW BACK PAIN WITHOUT SCIATICA: Status: RESOLVED | Noted: 2021-01-29 | Resolved: 2021-08-03

## 2021-08-03 PROBLEM — R53.83 FATIGUE, UNSPECIFIED TYPE: Status: ACTIVE | Noted: 2021-08-03

## 2021-08-03 PROBLEM — R45.86 MOOD CHANGE: Status: ACTIVE | Noted: 2021-08-03

## 2021-08-03 PROBLEM — R06.09 DYSPNEA ON EXERTION: Status: RESOLVED | Noted: 2020-11-30 | Resolved: 2021-08-03

## 2021-08-03 PROBLEM — E66.9 OBESITY, CLASS II, BMI 35-39.9: Status: ACTIVE | Noted: 2021-06-07

## 2021-08-04 ENCOUNTER — LAB ENCOUNTER (OUTPATIENT)
Dept: LAB | Age: 41
End: 2021-08-04
Attending: INTERNAL MEDICINE
Payer: COMMERCIAL

## 2021-08-04 DIAGNOSIS — R45.86 MOOD CHANGE: ICD-10-CM

## 2021-08-04 DIAGNOSIS — R53.83 FATIGUE, UNSPECIFIED TYPE: ICD-10-CM

## 2021-08-04 LAB
ALBUMIN SERPL-MCNC: 4.1 G/DL (ref 3.4–5)
ALBUMIN/GLOB SERPL: 1.1 {RATIO} (ref 1–2)
ALP LIVER SERPL-CCNC: 63 U/L
ALT SERPL-CCNC: 23 U/L
ANION GAP SERPL CALC-SCNC: 2 MMOL/L (ref 0–18)
AST SERPL-CCNC: 12 U/L (ref 15–37)
BASOPHILS # BLD AUTO: 0.03 X10(3) UL (ref 0–0.2)
BASOPHILS NFR BLD AUTO: 0.3 %
BILIRUB SERPL-MCNC: 0.4 MG/DL (ref 0.1–2)
BUN BLD-MCNC: 10 MG/DL (ref 7–18)
CALCIUM BLD-MCNC: 9 MG/DL (ref 8.5–10.1)
CHLORIDE SERPL-SCNC: 107 MMOL/L (ref 98–112)
CO2 SERPL-SCNC: 27 MMOL/L (ref 21–32)
CREAT BLD-MCNC: 0.82 MG/DL
EOSINOPHIL # BLD AUTO: 0.13 X10(3) UL (ref 0–0.7)
EOSINOPHIL NFR BLD AUTO: 1.4 %
ERYTHROCYTE [DISTWIDTH] IN BLOOD BY AUTOMATED COUNT: 11.9 %
FSH SERPL-ACNC: 5.2 MIU/ML
GLOBULIN PLAS-MCNC: 3.9 G/DL (ref 2.8–4.4)
GLUCOSE BLD-MCNC: 82 MG/DL (ref 70–99)
HCT VFR BLD AUTO: 40.5 %
HGB BLD-MCNC: 13.6 G/DL
IMM GRANULOCYTES # BLD AUTO: 0.02 X10(3) UL (ref 0–1)
IMM GRANULOCYTES NFR BLD: 0.2 %
LH SERPL-ACNC: 2.6 MIU/ML
LYMPHOCYTES # BLD AUTO: 2.54 X10(3) UL (ref 1–4)
LYMPHOCYTES NFR BLD AUTO: 26.9 %
M PROTEIN MFR SERPL ELPH: 8 G/DL (ref 6.4–8.2)
MCH RBC QN AUTO: 31.3 PG (ref 26–34)
MCHC RBC AUTO-ENTMCNC: 33.6 G/DL (ref 31–37)
MCV RBC AUTO: 93.1 FL
MONOCYTES # BLD AUTO: 0.41 X10(3) UL (ref 0.1–1)
MONOCYTES NFR BLD AUTO: 4.3 %
NEUTROPHILS # BLD AUTO: 6.3 X10 (3) UL (ref 1.5–7.7)
NEUTROPHILS # BLD AUTO: 6.3 X10(3) UL (ref 1.5–7.7)
NEUTROPHILS NFR BLD AUTO: 66.9 %
OSMOLALITY SERPL CALC.SUM OF ELEC: 280 MOSM/KG (ref 275–295)
PATIENT FASTING Y/N/NP: YES
PLATELET # BLD AUTO: 377 10(3)UL (ref 150–450)
POTASSIUM SERPL-SCNC: 3.8 MMOL/L (ref 3.5–5.1)
RBC # BLD AUTO: 4.35 X10(6)UL
SODIUM SERPL-SCNC: 136 MMOL/L (ref 136–145)
TSI SER-ACNC: 1.03 MIU/ML (ref 0.36–3.74)
VIT B12 SERPL-MCNC: 1359 PG/ML (ref 193–986)
WBC # BLD AUTO: 9.4 X10(3) UL (ref 4–11)

## 2021-08-04 PROCEDURE — 85025 COMPLETE CBC W/AUTO DIFF WBC: CPT

## 2021-08-04 PROCEDURE — 80053 COMPREHEN METABOLIC PANEL: CPT

## 2021-08-04 PROCEDURE — 83002 ASSAY OF GONADOTROPIN (LH): CPT

## 2021-08-04 PROCEDURE — 84443 ASSAY THYROID STIM HORMONE: CPT

## 2021-08-04 PROCEDURE — 83001 ASSAY OF GONADOTROPIN (FSH): CPT

## 2021-08-04 PROCEDURE — 36415 COLL VENOUS BLD VENIPUNCTURE: CPT

## 2021-08-04 PROCEDURE — 82607 VITAMIN B-12: CPT

## 2021-08-09 PROBLEM — U07.1 COVID: Status: ACTIVE | Noted: 2021-08-09

## 2021-08-09 PROBLEM — R09.81 NASAL CONGESTION: Status: ACTIVE | Noted: 2021-08-09

## 2021-08-09 PROBLEM — R43.0 LOSS OF SMELL: Status: ACTIVE | Noted: 2021-08-09

## 2021-08-09 PROBLEM — J01.00 ACUTE NON-RECURRENT MAXILLARY SINUSITIS: Status: ACTIVE | Noted: 2021-08-09

## 2021-08-09 PROBLEM — R05.9 COUGH: Status: ACTIVE | Noted: 2021-08-09

## 2021-08-09 PROBLEM — R43.2 LOSS OF TASTE: Status: ACTIVE | Noted: 2021-08-09

## 2021-12-06 PROBLEM — E11.65 CONTROLLED TYPE 2 DIABETES MELLITUS WITH HYPERGLYCEMIA, WITHOUT LONG-TERM CURRENT USE OF INSULIN (HCC): Status: ACTIVE | Noted: 2021-12-06

## 2023-01-26 ENCOUNTER — HOSPITAL ENCOUNTER (EMERGENCY)
Age: 43
Discharge: HOME | End: 2023-01-26
Attending: EMERGENCY MEDICINE
Payer: COMMERCIAL

## 2023-01-26 VITALS
WEIGHT: 230 LBS | SYSTOLIC BLOOD PRESSURE: 125 MMHG | HEIGHT: 69 IN | OXYGEN SATURATION: 97 % | TEMPERATURE: 98 F | HEART RATE: 72 BPM | BODY MASS INDEX: 34.07 KG/M2 | RESPIRATION RATE: 16 BRPM | DIASTOLIC BLOOD PRESSURE: 76 MMHG

## 2023-01-26 DIAGNOSIS — G89.18 POST-OPERATIVE PAIN: Primary | ICD-10-CM

## 2023-01-26 PROCEDURE — 96372 THER/PROPH/DIAG INJ SC/IM: CPT

## 2023-01-26 PROCEDURE — 99284 EMERGENCY DEPT VISIT MOD MDM: CPT

## 2023-01-26 PROCEDURE — 99283 EMERGENCY DEPT VISIT LOW MDM: CPT

## 2023-01-26 RX ORDER — HYDROCODONE BITARTRATE AND ACETAMINOPHEN 5; 325 MG/1; MG/1
1 TABLET ORAL EVERY 6 HOURS PRN
COMMUNITY

## 2023-01-26 RX ORDER — ONDANSETRON 4 MG/1
4 TABLET, ORALLY DISINTEGRATING ORAL ONCE
Status: COMPLETED | OUTPATIENT
Start: 2023-01-26 | End: 2023-01-26

## 2023-01-26 RX ORDER — HYDROMORPHONE HYDROCHLORIDE 1 MG/ML
1 INJECTION, SOLUTION INTRAMUSCULAR; INTRAVENOUS; SUBCUTANEOUS ONCE
Status: COMPLETED | OUTPATIENT
Start: 2023-01-26 | End: 2023-01-26

## 2023-01-26 NOTE — DISCHARGE INSTRUCTIONS
Elevate is much as possible. Follow-up with your orthopedic doctor tomorrow for recheck. Return if worsening symptoms or new complaints. normal

## 2023-10-06 ENCOUNTER — HOSPITAL ENCOUNTER (EMERGENCY)
Facility: HOSPITAL | Age: 43
Discharge: HOME | End: 2023-10-06
Attending: EMERGENCY MEDICINE

## 2023-10-06 ENCOUNTER — APPOINTMENT (OUTPATIENT)
Dept: GENERAL RADIOLOGY | Facility: HOSPITAL | Age: 43
End: 2023-10-06

## 2023-10-06 VITALS
HEIGHT: 69 IN | DIASTOLIC BLOOD PRESSURE: 96 MMHG | RESPIRATION RATE: 14 BRPM | TEMPERATURE: 98 F | WEIGHT: 219 LBS | OXYGEN SATURATION: 98 % | BODY MASS INDEX: 32.44 KG/M2 | SYSTOLIC BLOOD PRESSURE: 140 MMHG | HEART RATE: 70 BPM

## 2023-10-06 DIAGNOSIS — R00.2 PALPITATIONS: Primary | ICD-10-CM

## 2023-10-06 LAB
ALBUMIN SERPL-MCNC: 4 G/DL (ref 3.4–5)
ALBUMIN/GLOB SERPL: 0.9 {RATIO} (ref 1–2)
ALP LIVER SERPL-CCNC: 73 U/L
ALT SERPL-CCNC: 22 U/L
ANION GAP SERPL CALC-SCNC: 4 MMOL/L (ref 0–18)
AST SERPL-CCNC: 11 U/L (ref 15–37)
BASOPHILS # BLD AUTO: 0.05 X10(3) UL (ref 0–0.2)
BASOPHILS NFR BLD AUTO: 0.4 %
BILIRUB SERPL-MCNC: 0.2 MG/DL (ref 0.1–2)
BUN BLD-MCNC: 9 MG/DL (ref 7–18)
CALCIUM BLD-MCNC: 9.3 MG/DL (ref 8.5–10.1)
CHLORIDE SERPL-SCNC: 105 MMOL/L (ref 98–112)
CO2 SERPL-SCNC: 29 MMOL/L (ref 21–32)
CREAT BLD-MCNC: 0.9 MG/DL
EGFRCR SERPLBLD CKD-EPI 2021: 81 ML/MIN/1.73M2 (ref 60–?)
EOSINOPHIL # BLD AUTO: 0.17 X10(3) UL (ref 0–0.7)
EOSINOPHIL NFR BLD AUTO: 1.3 %
ERYTHROCYTE [DISTWIDTH] IN BLOOD BY AUTOMATED COUNT: 11.9 %
GLOBULIN PLAS-MCNC: 4.3 G/DL (ref 2.8–4.4)
GLUCOSE BLD-MCNC: 95 MG/DL (ref 70–99)
HCT VFR BLD AUTO: 40.2 %
HGB BLD-MCNC: 13.8 G/DL
IMM GRANULOCYTES # BLD AUTO: 0.05 X10(3) UL (ref 0–1)
IMM GRANULOCYTES NFR BLD: 0.4 %
LYMPHOCYTES # BLD AUTO: 3.04 X10(3) UL (ref 1–4)
LYMPHOCYTES NFR BLD AUTO: 24 %
MCH RBC QN AUTO: 31.5 PG (ref 26–34)
MCHC RBC AUTO-ENTMCNC: 34.3 G/DL (ref 31–37)
MCV RBC AUTO: 91.8 FL
MONOCYTES # BLD AUTO: 0.55 X10(3) UL (ref 0.1–1)
MONOCYTES NFR BLD AUTO: 4.3 %
NEUTROPHILS # BLD AUTO: 8.79 X10 (3) UL (ref 1.5–7.7)
NEUTROPHILS # BLD AUTO: 8.79 X10(3) UL (ref 1.5–7.7)
NEUTROPHILS NFR BLD AUTO: 69.6 %
OSMOLALITY SERPL CALC.SUM OF ELEC: 284 MOSM/KG (ref 275–295)
PLATELET # BLD AUTO: 344 10(3)UL (ref 150–450)
POTASSIUM SERPL-SCNC: 3.5 MMOL/L (ref 3.5–5.1)
PROT SERPL-MCNC: 8.3 G/DL (ref 6.4–8.2)
RBC # BLD AUTO: 4.38 X10(6)UL
SODIUM SERPL-SCNC: 138 MMOL/L (ref 136–145)
TROPONIN I HIGH SENSITIVITY: 4 NG/L
TSI SER-ACNC: 1.8 MIU/ML (ref 0.36–3.74)
WBC # BLD AUTO: 12.7 X10(3) UL (ref 4–11)

## 2023-10-06 PROCEDURE — 93010 ELECTROCARDIOGRAM REPORT: CPT

## 2023-10-06 PROCEDURE — 99285 EMERGENCY DEPT VISIT HI MDM: CPT

## 2023-10-06 PROCEDURE — 93005 ELECTROCARDIOGRAM TRACING: CPT

## 2023-10-06 PROCEDURE — 80053 COMPREHEN METABOLIC PANEL: CPT | Performed by: EMERGENCY MEDICINE

## 2023-10-06 PROCEDURE — 80053 COMPREHEN METABOLIC PANEL: CPT

## 2023-10-06 PROCEDURE — 84484 ASSAY OF TROPONIN QUANT: CPT | Performed by: EMERGENCY MEDICINE

## 2023-10-06 PROCEDURE — 85025 COMPLETE CBC W/AUTO DIFF WBC: CPT | Performed by: EMERGENCY MEDICINE

## 2023-10-06 PROCEDURE — 84484 ASSAY OF TROPONIN QUANT: CPT

## 2023-10-06 PROCEDURE — 71045 X-RAY EXAM CHEST 1 VIEW: CPT | Performed by: EMERGENCY MEDICINE

## 2023-10-06 PROCEDURE — 99284 EMERGENCY DEPT VISIT MOD MDM: CPT

## 2023-10-06 PROCEDURE — 85025 COMPLETE CBC W/AUTO DIFF WBC: CPT

## 2023-10-06 PROCEDURE — 84443 ASSAY THYROID STIM HORMONE: CPT | Performed by: EMERGENCY MEDICINE

## 2023-10-07 LAB
ATRIAL RATE: 77 BPM
P AXIS: 17 DEGREES
P-R INTERVAL: 174 MS
Q-T INTERVAL: 406 MS
QRS DURATION: 110 MS
QTC CALCULATION (BEZET): 459 MS
R AXIS: -27 DEGREES
T AXIS: 25 DEGREES
VENTRICULAR RATE: 77 BPM

## 2023-10-07 NOTE — DISCHARGE INSTRUCTIONS
An order was placed for a cardiac Holter monitor here through our system at [facility].  Call [phone] tomorrow morning to schedule appointment to have placed    Follow-up with cardiology after Holter monitor to go over results

## 2023-10-07 NOTE — ED INITIAL ASSESSMENT (HPI)
Has been having \"fluttering/palpitations/racing heart rate\" x2 weeks. Pt states that she called her primary and recommended to go to IC for EKG. IC sending pt here for HTN and abnormal EKG. Denies any headaches/sob/or GI sx at this time.

## 2023-10-13 ENCOUNTER — HOSPITAL ENCOUNTER (OUTPATIENT)
Dept: CV DIAGNOSTICS | Facility: HOSPITAL | Age: 43
Discharge: HOME | End: 2023-10-13
Attending: EMERGENCY MEDICINE
Payer: COMMERCIAL

## 2023-10-13 DIAGNOSIS — R00.2 PALPITATIONS: ICD-10-CM

## 2023-10-13 PROCEDURE — 93246 EXT ECG>7D<15D RECORDING: CPT | Performed by: EMERGENCY MEDICINE

## 2023-10-13 PROCEDURE — 93247 EXT ECG>7D<15D SCAN A/R: CPT | Performed by: EMERGENCY MEDICINE

## 2023-11-07 NOTE — ED NOTES
Assumed care of pt, pt up to bathroom on room air, feels winded when returning to cart. Room air sats 90-91% sats up to 94-96 when on oxygen 2-3 l nc. electronic

## 2023-11-13 NOTE — Clinical Note
Initiate insulin : See aboveContinue four times daily capillary blood glucose assessments (fasting and 2 hour postprandial)Weekly Maternal-Fetal Medicine review of capillary blood glucose valuesFollow-up growth ultrasound every 4 weeks in the third trimest Monitor electrolytes, blood glucose, renal indices, & LFTs.

## 2024-02-02 ENCOUNTER — OFFICE VISIT (OUTPATIENT)
Dept: FAMILY MEDICINE CLINIC | Facility: CLINIC | Age: 44
End: 2024-02-02
Payer: COMMERCIAL

## 2024-02-02 VITALS
OXYGEN SATURATION: 97 % | TEMPERATURE: 97 F | BODY MASS INDEX: 34.66 KG/M2 | WEIGHT: 234 LBS | RESPIRATION RATE: 16 BRPM | HEART RATE: 67 BPM | HEIGHT: 69 IN | SYSTOLIC BLOOD PRESSURE: 136 MMHG | DIASTOLIC BLOOD PRESSURE: 82 MMHG

## 2024-02-02 DIAGNOSIS — J03.90 TONSILLITIS: ICD-10-CM

## 2024-02-02 DIAGNOSIS — J02.9 SORE THROAT: Primary | ICD-10-CM

## 2024-02-02 PROCEDURE — 87880 STREP A ASSAY W/OPTIC: CPT | Performed by: NURSE PRACTITIONER

## 2024-02-02 PROCEDURE — 3008F BODY MASS INDEX DOCD: CPT | Performed by: NURSE PRACTITIONER

## 2024-02-02 PROCEDURE — 3079F DIAST BP 80-89 MM HG: CPT | Performed by: NURSE PRACTITIONER

## 2024-02-02 PROCEDURE — 3075F SYST BP GE 130 - 139MM HG: CPT | Performed by: NURSE PRACTITIONER

## 2024-02-02 PROCEDURE — 99202 OFFICE O/P NEW SF 15 MIN: CPT | Performed by: NURSE PRACTITIONER

## 2024-02-02 RX ORDER — FLUCONAZOLE 150 MG/1
150 TABLET ORAL ONCE
Qty: 1 TABLET | Refills: 0 | Status: SHIPPED | OUTPATIENT
Start: 2024-02-02 | End: 2024-02-02

## 2024-02-02 RX ORDER — TIRZEPATIDE 5 MG/.5ML
5 INJECTION, SOLUTION SUBCUTANEOUS WEEKLY
COMMUNITY
Start: 2024-01-15

## 2024-02-02 RX ORDER — AMOXICILLIN 875 MG/1
875 TABLET, COATED ORAL 2 TIMES DAILY
Qty: 14 TABLET | Refills: 0 | Status: SHIPPED | OUTPATIENT
Start: 2024-02-02 | End: 2024-02-09

## 2024-02-02 NOTE — PROGRESS NOTES
CHIEF COMPLAINT:     Chief Complaint   Patient presents with    Cough     Dry cough, sore throat, x6 days        HPI:   Bessie Stoner is a 43 year old female who presents for upper respiratory symptoms for  6 days. Patient reports worsening sore throat, last night sore throat awoke pt from sleep, swollen glands R>L, also dry cough. Symptoms have been worsening since onset.  Treating symptoms with otc.    Pt reports h/o recurrent sinusitis, tonsillitis and bronchitis, followed by PCP, ENT and allergist    Current Outpatient Medications   Medication Sig Dispense Refill    MOUNJARO 5 MG/0.5ML Subcutaneous Solution Pen-injector Inject 5 mg into the skin once a week.      Naltrexone-buPROPion HCl (CONTRAVE OR) Take by mouth.      amoxicillin 875 MG Oral Tab Take 1 tablet (875 mg total) by mouth 2 (two) times daily for 7 days. 14 tablet 0    fluconazole (DIFLUCAN) 150 MG Oral Tab Take 1 tablet (150 mg total) by mouth once for 1 dose. 1 tablet 0    metFORMIN 500 MG Oral Tab Take 1 tablet (500 mg total) by mouth 2 (two) times daily with meals.      Multiple Vitamin (MULTI-VITAMIN) Oral Tab Take 1 tablet by mouth daily.      LABETALOL  MG Oral Tab TAKE 1 TABLET TWICE A  tablet 3    HYDROcodone-acetaminophen 5-325 MG Oral Tab Take 1 tablet by mouth every 6 (six) hours as needed for Pain.      Phendimetrazine Tartrate 35 MG Oral Tab Take 1 tablet (35 mg total) by mouth 2 (two) times a day. (Patient not taking: Reported on 2/2/2024) 60 tablet 1    Dulaglutide (TRULICITY) 1.5 MG/0.5ML Subcutaneous Solution Pen-injector Inject 1.5 mg into the skin once a week. (Patient not taking: Reported on 2/2/2024) 6 mL 0      Past Medical History:   Diagnosis Date    Anxiety state     BACK PAIN     2 herniated discs&2 degenerated discs    Bronchitis     CERVICAL DYSPLASIA 2005, 2010    cin1    Controlled type 2 diabetes mellitus with hyperglycemia, without long-term current use of insulin ([facility]) 12/6/2021    DEPRESSION      h/o situational    Depression     situational    Gestational diabetes     insulin managed by [facility]    KIDNEY STONE     Osteoarthrosis, unspecified whether generalized or localized, unspecified site     OTHER DISEASES     h/o hr hpv    OTHER DISEASES     h/o chlamydia-    OTHER DISEASES     h/o yearly bronchitis    PULMONARY DISEASE     bronchitis yearly    VARICELLA       Past Surgical History:   Procedure Laterality Date    BACK SURGERY  1/8/15    L5-S1 lami disk       2010    COLONOSCOPY N/A 2019    Procedure: COLONOSCOPY, POSSIBLE BIOPSY, POSSIBLE POLYPECTOMY 82030;  Surgeon: Moncho Mukherjee MD;  Location: [facility]    FLUOR GID & LOCLZJ NDL/CATH SPI DX/THER NJX  2012    Procedure: LUMBAR EPIDURAL;  Surgeon: Kapil Nelson MD;  [facility]    FLUOR GID & LOCLZJ NDL/CATH SPI DX/THER NJX  3/21/2012    Procedure: LUMBAR EPIDURAL;  Surgeon: Kapil Nelson MD;  Location: [facility]    FLUOR GID & LOCLZJ NDL/CATH SPI DX/THER NJX  2012    Procedure: LUMBAR EPIDURAL;  Surgeon: Kapil Nelson MD;  [facility]: [facility]    FLUOR GID & LOCLZJ NDL/CATH SPI DX/THER NJX N/A 2014    Procedure: LUMBAR EPIDURAL;  Surgeon: Yang Cannon MD;  Location: [facility]    FLUOR GID & LOCLZJ NDL/CATH SPI DX/THER NJX  10/15/2014    Procedure: ;  Surgeon: Yang Cannon MD;  Location: [facility]    FLUOR GID & LOCLZJ NDL/CATH SPI DX/THER NJX N/A 10/30/2014    Procedure: LUMBAR EPIDURAL;  Surgeon: Yang Cannon MD;  Location: [facility]    FLUOR GID & LOCLZJ NDL/CATH SPI DX/THER NJX N/A 12/3/2014    Procedure: LUMBAR EPIDURAL;  Surgeon: Yang Cannon MD;  Location: [facility]    INJECTION, W/WO CONTRAST, DX/THERAPEUTIC SUBSTANCE, EPIDURAL/SUBARACHNOID; LUMBAR/SACRAL  2012    Procedure: LUMBAR EPIDURAL;  Surgeon: Kapil Nelson  MD;  Location: [facility]    INJECTION, W/WO CONTRAST, DX/THERAPEUTIC SUBSTANCE, EPIDURAL/SUBARACHNOID; LUMBAR/SACRAL  3/21/2012    Procedure: LUMBAR EPIDURAL;  Surgeon: Kapil Nelson MD;  Location: [facility]    INJECTION, W/WO CONTRAST, DX/THERAPEUTIC SUBSTANCE, EPIDURAL/SUBARACHNOID; LUMBAR/SACRAL  4/9/2012    Procedure: LUMBAR EPIDURAL;  Surgeon: Kapil Nelson MD;  Location: [facility]    INJECTION, W/WO CONTRAST, DX/THERAPEUTIC SUBSTANCE, EPIDURAL/SUBARACHNOID; LUMBAR/SACRAL N/A 5/28/2014    Procedure: LUMBAR EPIDURAL;  Surgeon: Yang Cannon MD;  Location: [facility]    INJECTION, W/WO CONTRAST, DX/THERAPEUTIC SUBSTANCE, EPIDURAL/SUBARACHNOID; LUMBAR/SACRAL N/A 10/15/2014    Procedure: LUMBAR EPIDURAL;  Surgeon: Yang Cannon MD;  Location: [facility]    INJECTION, W/WO CONTRAST, DX/THERAPEUTIC SUBSTANCE, EPIDURAL/SUBARACHNOID; LUMBAR/SACRAL N/A 10/30/2014    Procedure: LUMBAR EPIDURAL;  Surgeon: Yang Cannon MD;  Location: [facility]    INJECTION, W/WO CONTRAST, DX/THERAPEUTIC SUBSTANCE, EPIDURAL/SUBARACHNOID; LUMBAR/SACRAL N/A 12/3/2014    Procedure: LUMBAR EPIDURAL;  Surgeon: Yang Cannon MD;  Location: [facility]    M-SEDAJ BY  PHYS PERFRMG SVC 5+ YR  2/29/2012    Procedure: LUMBAR EPIDURAL;  Surgeon: Kapil Nelson MD;  Location: [facility]    M-SEDAJ BY  PHYS PERFRMG SVC 5+ YR  3/21/2012    Procedure: LUMBAR EPIDURAL;  Surgeon: Kapil Nelson MD;  Location: [facility]    M-SEDAJ BY  PHYS PERFRMG SVC 5+ YR  4/9/2012    Procedure: LUMBAR EPIDURAL;  Surgeon: Kapil Nelson MD;  Location: [facility]    M-SEDAJ BY  PHYS PERFRMG SVC 5+ YR N/A 5/28/2014    Procedure: LUMBAR EPIDURAL;  Surgeon: Yang Cannon MD;  Location: [facility]    M-SEDAJ BY  PHYS  [facility] N/A 10/30/2014    Procedure: LUMBAR EPIDURAL;  Surgeon: Yang Cannon MD;  Location: [facility]    M-SEDAJ BY ONIEL KAHN [facility]IS[facility] N/A 12/3/2014    Procedure: LUMBAR EPIDURAL;  Surgeon: Yang Cannon MD;  Location: [facility]         Social History     Socioeconomic History    Marital status:    Tobacco Use    Smoking status: Former     Packs/day: 0.30     Years: 2.00     Additional pack years: 0.00     Total pack years: 0.60     Types: Cigarettes     Quit date: 2014     Years since quittin.5    Smokeless tobacco: Never   Vaping Use    Vaping Use: Never used   Substance and Sexual Activity    Alcohol use: Not Currently     Comment: social    Drug use: No    Sexual activity: Yes     Partners: Male     Birth control/protection: Condom     Comment: 19 current -same partner         REVIEW OF SYSTEMS:   GENERAL: decreased appetite  SKIN: no rashes or abnormal skin lesions  HEENT: See HPI  LUNGS: See HPI  CARDIOVASCULAR: denies chest pain or palpitations   GI: denies N/V/C or abdominal pain      EXAM:   /82   Pulse 67   Temp 97 °F (36.1 °C) (Temporal)   Resp 16   Ht 5' 9\" (1.753 m)   Wt 234 lb (106.1 kg)   LMP 2024 (Approximate)   SpO2 97%   BMI 34.56 kg/m²   GENERAL: well developed, well nourished,in no apparent distress  SKIN: no rashes  HEAD: atraumatic, normocephalic.  no tenderness on palpation of sinuses  EYES: conjunctiva clear  EARS: TM's grey, no bulging, no retraction, no fluid, bony landmarks visible  NOSE: Nostrils patent, no nasal discharge, nasal mucosa pink, moist   THROAT: Oral mucosa pink, moist. Posterior pharynx is erythematous. no exudates. Tonsils right 2+, left 1+/4.    NECK: Supple, non-tender  LUNGS: clear to auscultation bilaterally, no wheezes or rhonchi. Breathing is non labored.  CARDIO: RRR without murmur  EXTREMITIES: no cyanosis, clubbing or edema  LYMPH:  + bilat ant cervical  lymphadenopathy.    PSYCH: pleasant mood and affect  NEURO: no focal deficits    ASSESSMENT AND PLAN:   Bessie Stoner is a 43 year old female who presents with upper respiratory symptoms that are consistent with    ASSESSMENT:   Encounter Diagnoses   Name Primary?    Sore throat Yes    Tonsillitis        PLAN:   Rapid strep negative  Meds as below.    Comfort care as described in Patient Instructions  Pt should f/u with PCP and allergist or ENT for f/u    Meds & Refills for this Visit:  Requested Prescriptions     Signed Prescriptions Disp Refills    amoxicillin 875 MG Oral Tab 14 tablet 0     Sig: Take 1 tablet (875 mg total) by mouth 2 (two) times daily for 7 days.    fluconazole (DIFLUCAN) 150 MG Oral Tab 1 tablet 0     Sig: Take 1 tablet (150 mg total) by mouth once for 1 dose.     Risks, benefits, and side effects of medication explained and discussed.    The patient indicates understanding of these issues and agrees to the plan.  The patient is asked to f/u with PCP if sx's persist or worsen.  There are no Patient Instructions on file for this visit.

## 2024-09-26 ENCOUNTER — LAB REQUISITION (OUTPATIENT)
Dept: LAB | Facility: HOSPITAL | Age: 44
End: 2024-09-26
Payer: COMMERCIAL

## 2024-09-26 DIAGNOSIS — K80.20 CALCULUS OF GALLBLADDER WITHOUT CHOLECYSTITIS WITHOUT OBSTRUCTION: ICD-10-CM

## 2024-09-26 PROCEDURE — 88304 TISSUE EXAM BY PATHOLOGIST: CPT | Performed by: SURGERY

## (undated) DEVICE — TRAXI PANNICULUS RETRACTOR WITH RETENTUS TECHNOLOGY (BMI 30-50): Brand: TRAXI® PANNICULUS RETRACTOR

## (undated) DEVICE — LARGE, DISPOSABLE ALEXIS O C-SECTION PROTECTOR - RETRACTOR: Brand: ALEXIS ® O C-SECTION PROTECTOR - RETRACTOR

## (undated) NOTE — LETTER
Dear new mom:    We've missed you! The nurses of [facility] have tried to reach you by phone to ask if you had any questions regarding your health or the care of your new little one.     Please feel free to call your doctor with an

## (undated) NOTE — LETTER
Ashley Weaver 89  [address] [facility] [address], [address] [facility]  Authorization for Surgical Operation and Procedure     Date:___11/24/20________                                                                                                         Time:__07 4.   Should the need arise during my operation or immediate post-operative period, I also consent to the administration of blood and/or blood products.   Further, I understand that despite careful testing and screening of blood or blood products by james 8.   I recognize that in the event my procedure results in extended X-Ray/fluoroscopy time, I may develop a skin reaction. 9.  If I have a Do Not Attempt Resuscitation (DNAR) order in place, that status will be suspended while in the operating room, proc 1. IAmy agree to be cared for by an anesthesiologist, who is specially trained to monitor me and give me medicine to put me to sleep or keep me comfortable during my procedure.     I understand that my anesthesiologist is not an employee 5. My doctor has explained to me other choices available to me for my care (alternatives).     6. Pregnant Patients (“epidural”):  I understand that the risks of having an epidural (medicine given into my back to help control pain during labor), include itc

## (undated) NOTE — LETTER
August 17, 2021          [address] [place] [address]          Dear Starr Zhou:    Results reviewed. Tests show no significant abnormalities.        The following are the results of your recent tests ordered by [facility] 1.50 - 7.70 x10(3) uL    Lymphocyte Absolute 2.54 1.00 - 4.00 x10(3) uL    Monocyte Absolute 0.41 0.10 - 1.00 x10(3) uL    Eosinophil Absolute 0.13 0.00 - 0.70 x10(3) uL    Basophil Absolute 0.03 0.00 - 0.20 x10(3) uL    Immature Granulocyte Absolute 0.02